# Patient Record
Sex: MALE | Race: WHITE | NOT HISPANIC OR LATINO | URBAN - METROPOLITAN AREA
[De-identification: names, ages, dates, MRNs, and addresses within clinical notes are randomized per-mention and may not be internally consistent; named-entity substitution may affect disease eponyms.]

---

## 2017-08-09 ENCOUNTER — DOCTOR'S OFFICE (OUTPATIENT)
Dept: URBAN - METROPOLITAN AREA CLINIC 166 | Facility: CLINIC | Age: 52
Setting detail: OPHTHALMOLOGY
End: 2017-08-09
Payer: COMMERCIAL

## 2017-08-09 DIAGNOSIS — H10.32: ICD-10-CM

## 2017-08-09 PROCEDURE — 92002 INTRM OPH EXAM NEW PATIENT: CPT | Performed by: OPTOMETRIST

## 2017-08-09 ASSESSMENT — REFRACTION_MANIFEST
OU_VA: 20/
OS_VA3: 20/
OS_VA3: 20/
OD_VA1: 20/
OS_VA2: 20/
OS_VA2: 20/
OU_VA: 20/
OS_VA1: 20/
OD_VA2: 20/
OD_VA2: 20/
OS_VA1: 20/
OS_VA3: 20/
OD_VA3: 20/
OD_VA1: 20/
OD_VA2: 20/
OU_VA: 20/
OD_VA1: 20/
OS_VA1: 20/
OS_VA2: 20/
OD_VA3: 20/
OD_VA3: 20/

## 2017-08-09 ASSESSMENT — REFRACTION_CURRENTRX
OS_OVR_VA: 20/
OD_OVR_VA: 20/
OS_OVR_VA: 20/
OD_OVR_VA: 20/
OS_OVR_VA: 20/
OD_OVR_VA: 20/

## 2017-08-09 ASSESSMENT — VISUAL ACUITY
OS_BCVA: 20/30-3
OD_BCVA: 20/50

## 2018-01-16 ENCOUNTER — DOCTOR'S OFFICE (OUTPATIENT)
Dept: URBAN - METROPOLITAN AREA CLINIC 166 | Facility: CLINIC | Age: 53
Setting detail: OPHTHALMOLOGY
End: 2018-01-16
Payer: COMMERCIAL

## 2018-01-16 DIAGNOSIS — H25.13: ICD-10-CM

## 2018-01-16 DIAGNOSIS — H52.13: ICD-10-CM

## 2018-01-16 DIAGNOSIS — H52.4: ICD-10-CM

## 2018-01-16 PROCEDURE — 92015 DETERMINE REFRACTIVE STATE: CPT | Performed by: OPTOMETRIST

## 2018-01-16 PROCEDURE — 92014 COMPRE OPH EXAM EST PT 1/>: CPT | Performed by: OPTOMETRIST

## 2018-01-16 ASSESSMENT — REFRACTION_CURRENTRX
OS_AXIS: 095
OD_OVR_VA: 20/
OS_OVR_VA: 20/
OS_ADD: +1.50
OD_ADD: +1.50
OD_OVR_VA: 20/
OS_OVR_VA: 20/
OD_OVR_VA: 20/
OS_CYLINDER: +0.50
OD_SPHERE: -6.50
OD_AXIS: 015
OD_CYLINDER: +1.00
OS_VPRISM_DIRECTION: PROGS
OS_OVR_VA: 20/
OS_SPHERE: -7.25
OD_VPRISM_DIRECTION: PROGS

## 2018-01-16 ASSESSMENT — KERATOMETRY
OD_AXISANGLE_DEGREES: 005
OD_K1POWER_DIOPTERS: 44.25
OS_K2POWER_DIOPTERS: 45.00
OS_AXISANGLE_DEGREES: 110
OS_K1POWER_DIOPTERS: 44.25
OD_K2POWER_DIOPTERS: 45.00

## 2018-01-16 ASSESSMENT — REFRACTION_OUTSIDERX
OU_VA: 20/
OS_CYLINDER: +0.50
OD_VA2: 20/
OD_VA3: 20/
OS_SPHERE: -6.00
OS_VA1: 20/30
OS_ADD: +2.25
OD_SPHERE: -6.00
OD_ADD: +2.25
OS_VA2: 20/
OD_CYLINDER: +1.00
OS_VA3: 20/
OD_VA1: 20/30-2
OD_AXIS: 015
OS_AXIS: 135

## 2018-01-16 ASSESSMENT — REFRACTION_AUTOREFRACTION
OD_AXIS: 101
OS_AXIS: 135
OD_SPHERE: -6.00
OS_SPHERE: -5.50
OD_CYLINDER: +1.25
OS_CYLINDER: +0.50

## 2018-01-16 ASSESSMENT — REFRACTION_MANIFEST
OD_VA3: 20/
OS_VA3: 20/
OS_VA1: 20/
OS_VA2: 20/
OU_VA: 20/
OD_VA3: 20/
OD_VA1: 20/
OD_VA2: 20/
OU_VA: 20/
OS_VA3: 20/
OD_VA1: 20/
OS_VA1: 20/
OS_VA2: 20/
OD_VA2: 20/

## 2018-01-16 ASSESSMENT — CONFRONTATIONAL VISUAL FIELD TEST (CVF)
OD_FINDINGS: FULL
OS_FINDINGS: FULL

## 2018-01-16 ASSESSMENT — SPHEQUIV_DERIVED
OD_SPHEQUIV: -5.375
OS_SPHEQUIV: -5.25

## 2018-01-16 ASSESSMENT — AXIALLENGTH_DERIVED
OD_AL: 25.4018
OS_AL: 25.3454

## 2018-01-16 ASSESSMENT — VISUAL ACUITY
OD_BCVA: 20/100-2
OS_BCVA: 20/40-1

## 2018-04-10 ENCOUNTER — DOCTOR'S OFFICE (OUTPATIENT)
Dept: URBAN - METROPOLITAN AREA CLINIC 166 | Facility: CLINIC | Age: 53
Setting detail: OPHTHALMOLOGY
End: 2018-04-10
Payer: COMMERCIAL

## 2018-04-10 DIAGNOSIS — H10.32: ICD-10-CM

## 2018-04-10 PROCEDURE — 92012 INTRM OPH EXAM EST PATIENT: CPT | Performed by: OPTOMETRIST

## 2018-04-10 ASSESSMENT — REFRACTION_CURRENTRX
OS_ADD: +1.50
OD_CYLINDER: +1.00
OS_VPRISM_DIRECTION: PROGS
OS_CYLINDER: +0.50
OS_SPHERE: -7.25
OD_VPRISM_DIRECTION: PROGS
OS_OVR_VA: 20/
OD_ADD: +1.50
OS_OVR_VA: 20/
OD_SPHERE: -6.50
OD_OVR_VA: 20/
OD_AXIS: 015
OS_OVR_VA: 20/
OS_AXIS: 095
OD_OVR_VA: 20/
OD_OVR_VA: 20/

## 2018-04-10 ASSESSMENT — REFRACTION_AUTOREFRACTION
OD_SPHERE: -6.00
OD_CYLINDER: +1.25
OS_CYLINDER: +0.50
OS_SPHERE: -5.50
OD_AXIS: 101
OS_AXIS: 135

## 2018-04-10 ASSESSMENT — REFRACTION_MANIFEST
OD_VA2: 20/
OD_VA1: 20/30-2
OD_SPHERE: -6.00
OS_VA3: 20/
OS_VA2: 20/
OS_SPHERE: -6.00
OS_VA1: 20/30
OS_AXIS: 135
OU_VA: 20/
OS_CYLINDER: +0.50
OU_VA: 20/
OS_VA3: 20/
OD_VA1: 20/
OD_AXIS: 015
OD_VA2: 20/
OD_CYLINDER: +1.00
OS_VA1: 20/
OS_VA2: 20/
OD_VA3: 20/
OD_ADD: +2.25
OD_VA3: 20/
OS_ADD: +2.25

## 2018-04-10 ASSESSMENT — SPHEQUIV_DERIVED
OD_SPHEQUIV: -5.5
OS_SPHEQUIV: -5.25
OD_SPHEQUIV: -5.375
OS_SPHEQUIV: -5.75

## 2018-04-10 ASSESSMENT — VISUAL ACUITY
OD_BCVA: 20/25
OS_BCVA: 20/25

## 2019-10-22 ENCOUNTER — DOCTOR'S OFFICE (OUTPATIENT)
Dept: URBAN - METROPOLITAN AREA CLINIC 166 | Facility: CLINIC | Age: 54
Setting detail: OPHTHALMOLOGY
End: 2019-10-22
Payer: COMMERCIAL

## 2019-10-22 DIAGNOSIS — H53.10: ICD-10-CM

## 2019-10-22 DIAGNOSIS — H52.4: ICD-10-CM

## 2019-10-22 DIAGNOSIS — H52.13: ICD-10-CM

## 2019-10-22 DIAGNOSIS — H25.13: ICD-10-CM

## 2019-10-22 PROCEDURE — 92014 COMPRE OPH EXAM EST PT 1/>: CPT | Performed by: OPTOMETRIST

## 2019-10-22 PROCEDURE — 92015 DETERMINE REFRACTIVE STATE: CPT | Performed by: OPTOMETRIST

## 2019-10-22 PROCEDURE — 92134 CPTRZ OPH DX IMG PST SGM RTA: CPT | Performed by: OPTOMETRIST

## 2019-10-22 ASSESSMENT — REFRACTION_MANIFEST
OD_VA1: 20/25
OS_VA2: 20/25(J1)
OD_ADD: +2.25
OU_VA: 20/
OD_AXIS: 015
OU_VA: 20/
OS_ADD: +2.25
OS_VA3: 20/
OS_CYLINDER: +0.50
OS_VA1: 20/
OD_VA2: 20/25(J1)
OD_VA3: 20/
OS_VA1: 20/50
OD_VA1: 20/
OS_VA2: 20/
OD_SPHERE: -6.00
OD_VA3: 20/
OS_VA3: 20/
OD_CYLINDER: +1.50
OS_AXIS: 135
OD_VA2: 20/
OS_SPHERE: -5.00

## 2019-10-22 ASSESSMENT — REFRACTION_CURRENTRX
OS_OVR_VA: 20/
OS_SPHERE: -6.00
OD_AXIS: 015
OD_OVR_VA: 20/
OD_ADD: +2.25
OD_AXIS: 017
OS_OVR_VA: 20/
OD_CYLINDER: +1.00
OS_AXIS: 137
OD_ADD: +1.50
OD_CYLINDER: +1.00
OS_ADD: +2.25
OD_VPRISM_DIRECTION: PROGS
OS_CYLINDER: +0.50
OS_OVR_VA: 20/
OD_SPHERE: -6.00
OD_VPRISM_DIRECTION: PROGS
OD_SPHERE: -6.50
OS_VPRISM_DIRECTION: PROGS
OS_ADD: +1.50
OD_OVR_VA: 20/
OS_CYLINDER: +0.50
OS_VPRISM_DIRECTION: PROGS
OS_SPHERE: -7.25
OD_OVR_VA: 20/
OS_AXIS: 095

## 2019-10-22 ASSESSMENT — REFRACTION_AUTOREFRACTION
OD_CYLINDER: +1.50
OS_AXIS: 141
OD_SPHERE: -5.75
OS_CYLINDER: +0.50
OS_SPHERE: -5.00
OD_AXIS: 013

## 2019-10-22 ASSESSMENT — VISUAL ACUITY
OS_BCVA: 20/30
OD_BCVA: 20/100-1

## 2019-10-22 ASSESSMENT — KERATOMETRY
OD_K1POWER_DIOPTERS: 44.75
OS_AXISANGLE_DEGREES: 033
OS_K1POWER_DIOPTERS: 44.00
OD_K2POWER_DIOPTERS: 45.00
OS_K2POWER_DIOPTERS: 45.00
OD_AXISANGLE_DEGREES: 026

## 2019-10-22 ASSESSMENT — SPHEQUIV_DERIVED
OS_SPHEQUIV: -4.75
OS_SPHEQUIV: -4.75
OD_SPHEQUIV: -5.25
OD_SPHEQUIV: -5

## 2019-10-22 ASSESSMENT — AXIALLENGTH_DERIVED
OD_AL: 25.2399
OD_AL: 25.1289
OS_AL: 25.1747
OS_AL: 25.1747

## 2019-10-22 ASSESSMENT — CONFRONTATIONAL VISUAL FIELD TEST (CVF)
OD_FINDINGS: FULL
OS_FINDINGS: FULL

## 2019-11-08 ENCOUNTER — DOCTOR'S OFFICE (OUTPATIENT)
Dept: URBAN - METROPOLITAN AREA CLINIC 166 | Facility: CLINIC | Age: 54
Setting detail: OPHTHALMOLOGY
End: 2019-11-08
Payer: COMMERCIAL

## 2019-11-08 DIAGNOSIS — H52.13: ICD-10-CM

## 2019-11-08 DIAGNOSIS — H53.10: ICD-10-CM

## 2019-11-08 DIAGNOSIS — H25.13: ICD-10-CM

## 2019-11-08 PROCEDURE — 92014 COMPRE OPH EXAM EST PT 1/>: CPT | Performed by: OPHTHALMOLOGY

## 2019-11-08 PROCEDURE — 92015 DETERMINE REFRACTIVE STATE: CPT | Performed by: OPHTHALMOLOGY

## 2019-11-08 ASSESSMENT — REFRACTION_AUTOREFRACTION
OS_AXIS: 142
OD_AXIS: 174
OS_SPHERE: -5.00
OS_CYLINDER: +0.25
OD_CYLINDER: +0.75
OD_SPHERE: -5.25

## 2019-11-08 ASSESSMENT — REFRACTION_CURRENTRX
OD_OVR_VA: 20/
OS_AXIS: 130
OD_VPRISM_DIRECTION: PROGS
OD_SPHERE: -6.00
OD_OVR_VA: 20/
OD_CYLINDER: +1.00
OS_OVR_VA: 20/
OS_ADD: +2.25
OS_OVR_VA: 20/
OS_OVR_VA: 20/
OS_VPRISM_DIRECTION: PROGS
OD_ADD: +2.25
OS_CYLINDER: +0.50
OD_AXIS: 015
OS_SPHERE: -6.00
OD_OVR_VA: 20/

## 2019-11-08 ASSESSMENT — VISUAL ACUITY
OD_BCVA: 20/100-1
OS_BCVA: 20/50

## 2019-11-08 ASSESSMENT — KERATOMETRY
METHOD_AUTO_MANUAL: AUTO
OS_AXISANGLE_DEGREES: 099
OS_K2POWER_DIOPTERS: 45.50
OS_K1POWER_DIOPTERS: 44.50
OD_K2POWER_DIOPTERS: 44.50
OD_K1POWER_DIOPTERS: 44.25
OD_AXISANGLE_DEGREES: 008

## 2019-11-08 ASSESSMENT — CONFRONTATIONAL VISUAL FIELD TEST (CVF)
OD_FINDINGS: FULL
OS_FINDINGS: FULL

## 2019-11-08 ASSESSMENT — REFRACTION_MANIFEST
OS_VA2: 20/
OS_AXIS: 135
OD_VA1: 20/
OS_VA3: 20/
OD_VA3: 20/
OD_VA2: 20/
OU_VA: 20/
OD_VA2: 20/25(J1)
OS_VA1: 20/
OS_VA3: 20/
OS_CYLINDER: +0.50
OS_VA1: 20/40-3
OD_CYLINDER: +1.50
OD_SPHERE: -6.50
OS_ADD: +2.25
OD_VA1: 20/25
OD_AXIS: 005
OD_ADD: +2.25
OS_VA2: BINOC
OD_VA3: 20/
OU_VA: 20/
OS_SPHERE: -5.25

## 2019-11-08 ASSESSMENT — SPHEQUIV_DERIVED
OS_SPHEQUIV: -5
OD_SPHEQUIV: -4.875
OS_SPHEQUIV: -4.875
OD_SPHEQUIV: -5.75

## 2019-11-08 ASSESSMENT — AXIALLENGTH_DERIVED
OD_AL: 25.2829
OS_AL: 25.022
OD_AL: 25.6806
OS_AL: 25.0769

## 2020-02-10 ENCOUNTER — DOCTOR'S OFFICE (OUTPATIENT)
Dept: URBAN - METROPOLITAN AREA CLINIC 166 | Facility: CLINIC | Age: 55
Setting detail: OPHTHALMOLOGY
End: 2020-02-10
Payer: COMMERCIAL

## 2020-02-10 DIAGNOSIS — H02.89: ICD-10-CM

## 2020-02-10 PROCEDURE — 92012 INTRM OPH EXAM EST PATIENT: CPT | Performed by: OPHTHALMOLOGY

## 2020-02-10 ASSESSMENT — REFRACTION_MANIFEST
OU_VA: 20/
OD_VA3: 20/
OS_AXIS: 130
OS_AXIS: 135
OU_VA: 20/
OD_VA1: 20/25+1
OD_SPHERE: -6.50
OD_VA3: 20/
OD_VA2: 20/
OD_ADD: +2.25
OD_VA2: 20/25(J1)
OD_SPHERE: -6.00
OD_AXIS: 015
OD_CYLINDER: +1.00
OS_VA1: 20/40-3
OS_VA2: BINOC
OS_SPHERE: -5.25
OS_ADD: +2.25
OD_CYLINDER: +1.50
OS_VA1: 20/60+1
OS_VA3: 20/
OS_SPHERE: -5.75
OD_AXIS: 005
OS_CYLINDER: +0.50
OS_CYLINDER: +0.50
OS_VA2: 20/
OS_VA3: 20/
OD_VA1: 20/25

## 2020-02-10 ASSESSMENT — SPHEQUIV_DERIVED
OS_SPHEQUIV: -5
OD_SPHEQUIV: -5.5
OS_SPHEQUIV: -5.5
OD_SPHEQUIV: -5.75

## 2020-02-10 ASSESSMENT — CONFRONTATIONAL VISUAL FIELD TEST (CVF)
OS_FINDINGS: FULL
OD_FINDINGS: FULL

## 2020-02-10 ASSESSMENT — LID EXAM ASSESSMENTS: OS_MEIBOMITIS: LLL LUL 1+

## 2020-02-13 ASSESSMENT — REFRACTION_AUTOREFRACTION
OS_CYLINDER: +0.25
OD_SPHERE: -5.25
OD_AXIS: 174
OS_AXIS: 142
OS_SPHERE: -5.00
OD_CYLINDER: +0.75

## 2020-02-13 ASSESSMENT — REFRACTION_CURRENTRX
OS_OVR_VA: 20/
OD_VPRISM_DIRECTION: PROGS
OS_AXIS: 130
OS_SPHERE: -6.00
OS_OVR_VA: 20/
OS_VPRISM_DIRECTION: PROGS
OD_SPHERE: -6.00
OS_CYLINDER: +0.50
OD_OVR_VA: 20/
OD_OVR_VA: 20/
OD_AXIS: 015
OS_ADD: +2.25
OD_CYLINDER: +1.00
OD_OVR_VA: 20/
OS_OVR_VA: 20/
OD_ADD: +2.25

## 2020-02-13 ASSESSMENT — VISUAL ACUITY
OD_BCVA: 20/60
OS_BCVA: 20/30+1

## 2020-02-13 ASSESSMENT — SPHEQUIV_DERIVED
OD_SPHEQUIV: -4.875
OS_SPHEQUIV: -4.875

## 2020-02-24 ENCOUNTER — DOCTOR'S OFFICE (OUTPATIENT)
Dept: URBAN - METROPOLITAN AREA CLINIC 166 | Facility: CLINIC | Age: 55
Setting detail: OPHTHALMOLOGY
End: 2020-02-24
Payer: COMMERCIAL

## 2020-02-24 DIAGNOSIS — H02.89: ICD-10-CM

## 2020-02-24 PROCEDURE — 92012 INTRM OPH EXAM EST PATIENT: CPT | Performed by: OPHTHALMOLOGY

## 2020-02-24 ASSESSMENT — VISUAL ACUITY
OS_BCVA: 20/40+1
OD_BCVA: 20/30+2

## 2020-02-24 ASSESSMENT — REFRACTION_MANIFEST
OD_VA1: 20/25+1
OD_CYLINDER: +1.00
OD_AXIS: 005
OS_CYLINDER: +0.50
OD_ADD: +2.25
OD_SPHERE: -6.00
OD_AXIS: 015
OU_VA: 20/
OS_ADD: +2.25
OD_SPHERE: -6.50
OS_VA2: BINOC
OS_VA3: 20/
OS_SPHERE: -5.25
OU_VA: 20/
OD_VA2: 20/
OS_AXIS: 135
OS_SPHERE: -5.75
OS_VA3: 20/
OS_VA1: 20/60+1
OS_VA1: 20/40-3
OD_VA2: 20/25(J1)
OD_VA3: 20/
OD_VA1: 20/25
OD_CYLINDER: +1.50
OS_CYLINDER: +0.50
OS_VA2: 20/
OS_AXIS: 130
OD_VA3: 20/

## 2020-02-24 ASSESSMENT — REFRACTION_CURRENTRX
OS_SPHERE: -6.00
OS_CYLINDER: +0.50
OS_OVR_VA: 20/
OD_VPRISM_DIRECTION: PROGS
OD_OVR_VA: 20/
OS_OVR_VA: 20/
OD_AXIS: 015
OS_ADD: +2.25
OD_CYLINDER: +1.00
OD_OVR_VA: 20/
OS_VPRISM_DIRECTION: PROGS
OD_OVR_VA: 20/
OS_AXIS: 130
OD_ADD: +2.25
OS_OVR_VA: 20/
OD_SPHERE: -6.00

## 2020-02-24 ASSESSMENT — SPHEQUIV_DERIVED
OD_SPHEQUIV: -5.5
OS_SPHEQUIV: -5.5
OD_SPHEQUIV: -5.75
OD_SPHEQUIV: -4.875
OS_SPHEQUIV: -4.875
OS_SPHEQUIV: -5

## 2020-02-24 ASSESSMENT — REFRACTION_AUTOREFRACTION
OD_SPHERE: -5.25
OD_AXIS: 174
OS_AXIS: 142
OD_CYLINDER: +0.75
OS_SPHERE: -5.00
OS_CYLINDER: +0.25

## 2020-02-24 ASSESSMENT — LID EXAM ASSESSMENTS: OS_MEIBOMITIS: LLL LUL 1+

## 2020-03-19 ENCOUNTER — DOCTOR'S OFFICE (OUTPATIENT)
Dept: URBAN - METROPOLITAN AREA CLINIC 166 | Facility: CLINIC | Age: 55
Setting detail: OPHTHALMOLOGY
End: 2020-03-19
Payer: COMMERCIAL

## 2020-03-19 DIAGNOSIS — H02.89: ICD-10-CM

## 2020-03-19 PROCEDURE — 92012 INTRM OPH EXAM EST PATIENT: CPT | Performed by: OPHTHALMOLOGY

## 2020-03-19 ASSESSMENT — REFRACTION_MANIFEST
OS_VA2: 20/BINOC
OS_AXIS: 135
OS_VA3: 20/
OS_VA2: BINOC
OD_VA2: 20/25(J1)
OS_VA1: 20/40-3
OD_VA1: 20/30-1
OS_VA3: 20/
OS_VA1: 20/25
OD_AXIS: 015
OD_CYLINDER: +1.00
OD_SPHERE: -6.00
OS_SPHERE: -6.00
OD_ADD: +2.50
OD_VA2: 20/20(J1+)
OD_SPHERE: -6.50
OD_AXIS: 005
OS_CYLINDER: +0.50
OD_VA1: 20/25
OD_ADD: +2.25
OS_SPHERE: -5.25
OD_CYLINDER: +1.50
OS_CYLINDER: +0.50
OU_VA: 20/
OD_VA3: 20/
OS_ADD: +2.25
OS_ADD: +2.50
OS_AXIS: 130
OD_VA3: 20/
OU_VA: 20/

## 2020-03-19 ASSESSMENT — REFRACTION_AUTOREFRACTION
OD_AXIS: 174
OS_CYLINDER: +0.25
OS_SPHERE: -5.00
OD_CYLINDER: +0.75
OS_AXIS: 142
OD_SPHERE: -5.25

## 2020-03-19 ASSESSMENT — REFRACTION_CURRENTRX
OD_SPHERE: -6.00
OS_CYLINDER: +0.50
OD_OVR_VA: 20/
OD_ADD: +2.25
OD_AXIS: 015
OS_VPRISM_DIRECTION: PROGS
OS_SPHERE: -6.00
OS_ADD: +2.25
OS_OVR_VA: 20/
OS_AXIS: 130
OD_OVR_VA: 20/
OD_OVR_VA: 20/
OD_CYLINDER: +1.00
OD_VPRISM_DIRECTION: PROGS
OS_OVR_VA: 20/
OS_OVR_VA: 20/

## 2020-03-19 ASSESSMENT — SPHEQUIV_DERIVED
OD_SPHEQUIV: -5.75
OD_SPHEQUIV: -4.875
OS_SPHEQUIV: -4.875
OD_SPHEQUIV: -5.5
OS_SPHEQUIV: -5.75
OS_SPHEQUIV: -5

## 2020-03-19 ASSESSMENT — LID EXAM ASSESSMENTS: OS_MEIBOMITIS: LLL LUL 1+

## 2020-03-19 ASSESSMENT — VISUAL ACUITY
OS_BCVA: 20/30
OD_BCVA: 20/30

## 2021-02-25 ENCOUNTER — NURSE TRIAGE (OUTPATIENT)
Dept: OTHER | Facility: CLINIC | Age: 56
End: 2021-02-25

## 2021-03-18 ENCOUNTER — DOCTOR'S OFFICE (OUTPATIENT)
Dept: URBAN - METROPOLITAN AREA CLINIC 166 | Facility: CLINIC | Age: 56
Setting detail: OPHTHALMOLOGY
End: 2021-03-18

## 2021-03-18 ENCOUNTER — DOCTOR'S OFFICE (OUTPATIENT)
Dept: URBAN - METROPOLITAN AREA CLINIC 166 | Facility: CLINIC | Age: 56
Setting detail: OPHTHALMOLOGY
End: 2021-03-18
Payer: COMMERCIAL

## 2021-03-18 VITALS — HEIGHT: 60 IN

## 2021-03-18 DIAGNOSIS — H01.003: ICD-10-CM

## 2021-03-18 DIAGNOSIS — H04.123: ICD-10-CM

## 2021-03-18 DIAGNOSIS — H52.13: ICD-10-CM

## 2021-03-18 DIAGNOSIS — H53.10: ICD-10-CM

## 2021-03-18 DIAGNOSIS — H25.13: ICD-10-CM

## 2021-03-18 PROCEDURE — THERAPEARL THERAPEARL: Performed by: OPHTHALMOLOGY

## 2021-03-18 PROCEDURE — 92012 INTRM OPH EXAM EST PATIENT: CPT | Performed by: OPHTHALMOLOGY

## 2021-03-18 ASSESSMENT — KERATOMETRY
OS_K2POWER_DIOPTERS: 45.50
OS_AXISANGLE_DEGREES: 099
METHOD_AUTO_MANUAL: AUTO
OD_AXISANGLE_DEGREES: 008
OD_K1POWER_DIOPTERS: 44.25
OD_K2POWER_DIOPTERS: 44.50
OS_K1POWER_DIOPTERS: 44.50

## 2021-03-18 ASSESSMENT — TONOMETRY
OD_IOP_MMHG: 18
OS_IOP_MMHG: 18

## 2021-03-18 ASSESSMENT — REFRACTION_CURRENTRX
OS_AXIS: 130
OD_ADD: +2.25
OS_ADD: +2.25
OD_VPRISM_DIRECTION: PROGS
OD_SPHERE: -6.00
OS_CYLINDER: +0.50
OD_OVR_VA: 20/
OD_CYLINDER: +1.00
OD_AXIS: 015
OS_OVR_VA: 20/
OS_SPHERE: -6.00
OS_VPRISM_DIRECTION: PROGS

## 2021-03-18 ASSESSMENT — AXIALLENGTH_DERIVED
OS_AL: 25.022
OD_AL: 25.5657
OS_AL: 25.0769
OD_AL: 25.6806
OD_AL: 25.2829
OS_AL: 25.4115

## 2021-03-18 ASSESSMENT — REFRACTION_AUTOREFRACTION
OD_CYLINDER: +0.75
OS_CYLINDER: +0.25
OD_SPHERE: -5.25
OS_SPHERE: -5.00
OD_AXIS: 174
OS_AXIS: 142

## 2021-03-18 ASSESSMENT — REFRACTION_MANIFEST
OD_ADD: +2.25
OS_CYLINDER: +0.50
OD_ADD: +2.50
OD_AXIS: 005
OD_CYLINDER: +1.00
OD_VA1: 20/25
OS_AXIS: 130
OS_SPHERE: -6.00
OD_SPHERE: -6.50
OS_VA2: 20/BINOC
OD_VA2: 20/20(J1+)
OS_ADD: +2.50
OD_VA1: 20/30-1
OD_VA2: 20/25(J1)
OS_VA2: BINOC
OS_VA1: 20/40-3
OS_VA1: 20/25
OS_CYLINDER: +0.50
OD_AXIS: 015
OS_SPHERE: -5.25
OS_ADD: +2.25
OS_AXIS: 135
OD_CYLINDER: +1.50
OD_SPHERE: -6.00

## 2021-03-18 ASSESSMENT — LID EXAM ASSESSMENTS
OS_MEIBOMITIS: LLL LUL 1+
OD_MEIBOMITIS: RLL 1+

## 2021-03-18 ASSESSMENT — SPHEQUIV_DERIVED
OD_SPHEQUIV: -5.5
OD_SPHEQUIV: -5.75
OS_SPHEQUIV: -5
OD_SPHEQUIV: -4.875
OS_SPHEQUIV: -4.875
OS_SPHEQUIV: -5.75

## 2021-03-18 ASSESSMENT — VISUAL ACUITY
OD_BCVA: 20/30
OS_BCVA: 20/30

## 2021-04-01 ENCOUNTER — DOCTOR'S OFFICE (OUTPATIENT)
Dept: URBAN - METROPOLITAN AREA CLINIC 166 | Facility: CLINIC | Age: 56
Setting detail: OPHTHALMOLOGY
End: 2021-04-01
Payer: COMMERCIAL

## 2021-04-01 DIAGNOSIS — H25.12: ICD-10-CM

## 2021-04-01 DIAGNOSIS — H01.002: ICD-10-CM

## 2021-04-01 DIAGNOSIS — H01.004: ICD-10-CM

## 2021-04-01 DIAGNOSIS — H01.005: ICD-10-CM

## 2021-04-01 PROCEDURE — 92012 INTRM OPH EXAM EST PATIENT: CPT | Performed by: OPHTHALMOLOGY

## 2021-04-01 ASSESSMENT — REFRACTION_MANIFEST
OD_CYLINDER: +1.00
OD_ADD: +2.25
OD_ADD: +2.50
OD_SPHERE: -6.50
OS_VA1: 20/40-3
OS_SPHERE: -5.25
OD_VA1: 20/30-1
OD_SPHERE: -6.00
OS_ADD: +2.50
OS_VA2: 20/BINOC
OD_AXIS: 005
OD_VA1: 20/25
OS_VA1: 20/25
OD_CYLINDER: +1.50
OD_VA2: 20/25(J1)
OD_AXIS: 015
OS_CYLINDER: +0.50
OS_VA2: BINOC
OD_VA2: 20/20(J1+)
OS_CYLINDER: +0.50
OS_ADD: +2.25
OS_AXIS: 135
OS_SPHERE: -6.00
OS_AXIS: 130

## 2021-04-01 ASSESSMENT — REFRACTION_CURRENTRX
OD_CYLINDER: +1.00
OD_OVR_VA: 20/
OS_SPHERE: -6.00
OS_OVR_VA: 20/
OD_AXIS: 015
OD_SPHERE: -6.00
OS_ADD: +2.25
OD_VPRISM_DIRECTION: PROGS
OD_ADD: +2.25
OS_CYLINDER: +0.50
OS_AXIS: 130
OS_VPRISM_DIRECTION: PROGS

## 2021-04-01 ASSESSMENT — KERATOMETRY
OS_K1POWER_DIOPTERS: 44.00
OD_K2POWER_DIOPTERS: 44.50
OD_K1POWER_DIOPTERS: 44.25
OS_AXISANGLE_DEGREES: 107
METHOD_AUTO_MANUAL: AUTO
OS_K2POWER_DIOPTERS: 45.00
OD_AXISANGLE_DEGREES: 179

## 2021-04-01 ASSESSMENT — VISUAL ACUITY
OD_BCVA: 20/30-2
OS_BCVA: 20/40+2

## 2021-04-01 ASSESSMENT — AXIALLENGTH_DERIVED
OS_AL: 25.2861
OD_AL: 25.6806
OS_AL: 25.7418
OS_AL: 25.6263
OD_AL: 25.2271
OD_AL: 25.5657

## 2021-04-01 ASSESSMENT — LID EXAM ASSESSMENTS
OS_MEIBOMITIS: LLL LUL 1+
OD_MEIBOMITIS: RLL 1+

## 2021-04-01 ASSESSMENT — REFRACTION_AUTOREFRACTION
OS_AXIS: 101
OS_SPHERE: -6.50
OD_SPHERE: -5.25
OD_AXIS: 002
OD_CYLINDER: +1.00
OS_CYLINDER: +1.00

## 2021-04-01 ASSESSMENT — SPHEQUIV_DERIVED
OD_SPHEQUIV: -5.5
OS_SPHEQUIV: -5.75
OD_SPHEQUIV: -4.75
OS_SPHEQUIV: -5
OS_SPHEQUIV: -6
OD_SPHEQUIV: -5.75

## 2021-04-29 ENCOUNTER — DOCTOR'S OFFICE (OUTPATIENT)
Dept: URBAN - METROPOLITAN AREA CLINIC 166 | Facility: CLINIC | Age: 56
Setting detail: OPHTHALMOLOGY
End: 2021-04-29
Payer: COMMERCIAL

## 2021-04-29 ENCOUNTER — RX ONLY (RX ONLY)
Age: 56
End: 2021-04-29

## 2021-04-29 PROCEDURE — 92014 COMPRE OPH EXAM EST PT 1/>: CPT | Performed by: OPHTHALMOLOGY

## 2021-04-29 PROCEDURE — 92015 DETERMINE REFRACTIVE STATE: CPT | Performed by: OPHTHALMOLOGY

## 2021-04-29 ASSESSMENT — REFRACTION_CURRENTRX
OS_CYLINDER: +0.50
OD_ADD: +2.25
OD_OVR_VA: 20/
OS_VPRISM_DIRECTION: PROGS
OS_ADD: +2.25
OS_SPHERE: -6.00
OS_OVR_VA: 20/
OS_AXIS: 130
OD_SPHERE: -6.00
OD_AXIS: 015
OD_VPRISM_DIRECTION: PROGS
OD_CYLINDER: +1.00

## 2021-04-29 ASSESSMENT — REFRACTION_MANIFEST
OD_AXIS: 015
OS_ADD: +2.25
OD_AXIS: 005
OS_VA2: 20/BINOC
OD_VA1: 20/30-1
OS_AXIS: 130
OS_ADD: +2.50
OD_CYLINDER: +1.50
OS_VA1: 20/25
OD_ADD: +2.50
OD_VA1: 20/25+1
OS_CYLINDER: +0.50
OS_VA2: BINOC
OD_SPHERE: -6.00
OS_CYLINDER: +0.50
OS_AXIS: 130
OD_VA2: 20/25(J1)
OD_VA2: 20/20(J1+)
OD_ADD: +2.50
OS_VA1: 20/25
OS_SPHERE: -6.00
OD_CYLINDER: +1.00
OS_SPHERE: -6.00
OD_SPHERE: -6.00

## 2021-04-29 ASSESSMENT — TONOMETRY
OD_IOP_MMHG: 18
OS_IOP_MMHG: 18

## 2021-04-29 ASSESSMENT — VISUAL ACUITY
OS_BCVA: 20/40+1
OD_BCVA: 20/30-2

## 2021-04-29 ASSESSMENT — REFRACTION_AUTOREFRACTION
OS_SPHERE: -5.25
OD_AXIS: 001
OD_CYLINDER: +1.25
OS_CYLINDER: +0.50
OD_SPHERE: -5.75
OS_AXIS: 099

## 2021-04-29 ASSESSMENT — CONFRONTATIONAL VISUAL FIELD TEST (CVF)
OS_FINDINGS: FULL
OD_FINDINGS: FULL

## 2021-04-29 ASSESSMENT — SPHEQUIV_DERIVED
OS_SPHEQUIV: -5.75
OD_SPHEQUIV: -5.125
OD_SPHEQUIV: -5.25
OS_SPHEQUIV: -5
OS_SPHEQUIV: -5.75
OD_SPHEQUIV: -5.5

## 2021-04-29 ASSESSMENT — KERATOMETRY
OS_K2POWER_DIOPTERS: 44.25
OS_AXISANGLE_DEGREES: 110
METHOD_AUTO_MANUAL: AUTO
OD_AXISANGLE_DEGREES: 011
OD_K2POWER_DIOPTERS: 45.00
OS_K1POWER_DIOPTERS: 43.25
OD_K1POWER_DIOPTERS: 44.00

## 2021-04-29 ASSESSMENT — LID EXAM ASSESSMENTS
OS_MEIBOMITIS: LLL LUL 1+
OD_MEIBOMITIS: RLL 1+

## 2021-04-29 ASSESSMENT — AXIALLENGTH_DERIVED
OD_AL: 25.3985
OD_AL: 25.5119
OS_AL: 25.6066
OS_AL: 25.9555
OD_AL: 25.3422
OS_AL: 25.9555

## 2022-06-10 ENCOUNTER — DOCTOR'S OFFICE (OUTPATIENT)
Dept: URBAN - METROPOLITAN AREA CLINIC 157 | Facility: CLINIC | Age: 57
Setting detail: OPHTHALMOLOGY
End: 2022-06-10
Payer: COMMERCIAL

## 2022-06-10 PROCEDURE — 99213 OFFICE O/P EST LOW 20 MIN: CPT | Performed by: OPTOMETRIST

## 2022-06-10 ASSESSMENT — REFRACTION_MANIFEST
OD_ADD: +2.50
OD_CYLINDER: +1.50
OS_ADD: +2.25
OS_SPHERE: -6.00
OS_CYLINDER: +0.50
OD_SPHERE: -6.00
OD_VA2: 20/20(J1+)
OS_VA1: 20/25
OD_ADD: +2.50
OS_CYLINDER: +0.50
OD_VA2: 20/25(J1)
OD_AXIS: 005
OS_SPHERE: -6.00
OD_CYLINDER: +1.00
OD_SPHERE: -6.00
OS_AXIS: 130
OS_AXIS: 130
OD_VA1: 20/30-1
OS_ADD: +2.50
OS_VA1: 20/25
OD_AXIS: 015
OD_VA1: 20/25+1
OS_VA2: BINOC
OS_VA2: 20/BINOC

## 2022-06-10 ASSESSMENT — VISUAL ACUITY
OD_BCVA: 20/80
OS_BCVA: 20/25

## 2022-06-10 ASSESSMENT — REFRACTION_CURRENTRX
OD_CYLINDER: +1.00
OS_OVR_VA: 20/
OS_CYLINDER: +0.50
OD_ADD: +2.25
OD_AXIS: 015
OS_VPRISM_DIRECTION: PROGS
OS_AXIS: 130
OS_SPHERE: -6.00
OD_VPRISM_DIRECTION: PROGS
OD_SPHERE: -6.00
OS_ADD: +2.25
OD_OVR_VA: 20/

## 2022-06-10 ASSESSMENT — SPHEQUIV_DERIVED
OD_SPHEQUIV: -5.25
OS_SPHEQUIV: -5.75
OS_SPHEQUIV: -5.75
OD_SPHEQUIV: -5.5
OS_SPHEQUIV: 23.375
OD_SPHEQUIV: -4.5

## 2022-06-10 ASSESSMENT — LID EXAM ASSESSMENTS
OS_MEIBOMITIS: LLL LUL 1+
OD_MEIBOMITIS: RLL 1+
OS_EDEMA: LUL 1+

## 2022-06-10 ASSESSMENT — REFRACTION_AUTOREFRACTION
OD_CYLINDER: +1.50
OS_SPHERE: +23.25
OS_AXIS: 170
OS_CYLINDER: +0.25
OD_SPHERE: -5.25
OD_AXIS: 005

## 2022-06-10 ASSESSMENT — AXIALLENGTH_DERIVED
OD_AL: 25.5657
OS_AL: 25.5184
OS_AL: 16.79
OD_AL: 25.1162
OS_AL: 25.5184
OD_AL: 25.4519

## 2022-06-10 ASSESSMENT — CONFRONTATIONAL VISUAL FIELD TEST (CVF)
OD_FINDINGS: FULL
OS_FINDINGS: FULL

## 2022-06-10 ASSESSMENT — KERATOMETRY
OD_K1POWER_DIOPTERS: 44.00
OS_K2POWER_DIOPTERS: 45.50
OS_K1POWER_DIOPTERS: 44.00
METHOD_AUTO_MANUAL: AUTO
OD_K2POWER_DIOPTERS: 44.75
OD_AXISANGLE_DEGREES: 175
OS_AXISANGLE_DEGREES: 125

## 2022-06-10 ASSESSMENT — TONOMETRY
OS_IOP_MMHG: 16
OD_IOP_MMHG: 16

## 2022-06-17 ENCOUNTER — RX ONLY (RX ONLY)
Age: 57
End: 2022-06-17

## 2022-06-17 ENCOUNTER — DOCTOR'S OFFICE (OUTPATIENT)
Dept: URBAN - METROPOLITAN AREA CLINIC 157 | Facility: CLINIC | Age: 57
Setting detail: OPHTHALMOLOGY
End: 2022-06-17
Payer: COMMERCIAL

## 2022-06-17 PROCEDURE — 92014 COMPRE OPH EXAM EST PT 1/>: CPT | Performed by: OPTOMETRIST

## 2022-06-17 ASSESSMENT — SPHEQUIV_DERIVED
OS_SPHEQUIV: -5.75
OD_SPHEQUIV: -5.25
OS_SPHEQUIV: -5.5
OS_SPHEQUIV: -5.75
OD_SPHEQUIV: -5.5
OD_SPHEQUIV: -4.5
OS_SPHEQUIV: 23.375

## 2022-06-17 ASSESSMENT — TONOMETRY
OS_IOP_MMHG: 19
OD_IOP_MMHG: 15

## 2022-06-17 ASSESSMENT — REFRACTION_MANIFEST
OD_VA1: 20/30-1
OD_SPHERE: -6.00
OD_VA2: 20/25(J1)
OS_VA1: 20/50
OS_SPHERE: -6.00
OS_CYLINDER: +1.00
OS_VA1: 20/25
OD_VA1: 20/25+1
OD_VA2: 20/20(J1+)
OS_CYLINDER: +0.50
OD_ADD: +2.50
OD_SPHERE: -6.00
OS_ADD: +2.25
OS_VA2: BINOC
OS_SPHERE: -6.00
OS_ADD: +2.50
OS_AXIS: 120
OD_AXIS: 015
OD_CYLINDER: +1.50
OS_CYLINDER: +0.50
OS_AXIS: 130
OS_VA2: 20/BINOC
OD_CYLINDER: +1.00
OS_VA1: 20/25
OD_ADD: +2.50
OD_AXIS: 005
OS_SPHERE: -6.00
OS_AXIS: 130

## 2022-06-17 ASSESSMENT — LID POSITION - ENTROPION: OS_ENTROPION: LUL 1+

## 2022-06-17 ASSESSMENT — KERATOMETRY
OD_K1POWER_DIOPTERS: 44.00
OS_K2POWER_DIOPTERS: 45.50
METHOD_AUTO_MANUAL: AUTO
OS_K1POWER_DIOPTERS: 44.00
OD_AXISANGLE_DEGREES: 175
OD_K2POWER_DIOPTERS: 44.75
OS_AXISANGLE_DEGREES: 125

## 2022-06-17 ASSESSMENT — AXIALLENGTH_DERIVED
OS_AL: 25.5184
OS_AL: 25.405
OS_AL: 25.5184
OD_AL: 25.5657
OS_AL: 16.79
OD_AL: 25.1162
OD_AL: 25.4519

## 2022-06-17 ASSESSMENT — REFRACTION_AUTOREFRACTION
OS_CYLINDER: +0.25
OD_AXIS: 005
OD_SPHERE: -5.25
OS_SPHERE: +23.25
OD_CYLINDER: +1.50
OS_AXIS: 170

## 2022-06-17 ASSESSMENT — REFRACTION_CURRENTRX
OS_OVR_VA: 20/
OD_AXIS: 015
OD_OVR_VA: 20/
OD_ADD: +2.25
OD_CYLINDER: +1.00
OS_ADD: +2.25
OS_SPHERE: -6.00
OD_SPHERE: -6.00
OS_VPRISM_DIRECTION: PROGS
OS_AXIS: 130
OS_CYLINDER: +0.50
OD_VPRISM_DIRECTION: PROGS

## 2022-06-17 ASSESSMENT — LID EXAM ASSESSMENTS
OS_MEIBOMITIS: LLL LUL 1+
OD_MEIBOMITIS: RLL 1+

## 2022-06-17 ASSESSMENT — VISUAL ACUITY
OS_BCVA: 20/20
OD_BCVA: 20/80

## 2022-06-17 ASSESSMENT — CONFRONTATIONAL VISUAL FIELD TEST (CVF)
OD_FINDINGS: FULL
OS_FINDINGS: FULL

## 2022-07-01 ENCOUNTER — DOCTOR'S OFFICE (OUTPATIENT)
Dept: URBAN - METROPOLITAN AREA CLINIC 157 | Facility: CLINIC | Age: 57
Setting detail: OPHTHALMOLOGY
End: 2022-07-01
Payer: COMMERCIAL

## 2022-07-01 PROCEDURE — 92012 INTRM OPH EXAM EST PATIENT: CPT | Performed by: OPHTHALMOLOGY

## 2022-07-01 ASSESSMENT — REFRACTION_CURRENTRX
OD_SPHERE: -6.00
OD_VPRISM_DIRECTION: PROGS
OS_SPHERE: -6.00
OD_ADD: +2.25
OS_AXIS: 130
OD_CYLINDER: +1.00
OS_OVR_VA: 20/
OS_CYLINDER: +0.50
OS_VPRISM_DIRECTION: PROGS
OD_OVR_VA: 20/
OS_ADD: +2.25
OD_AXIS: 015

## 2022-07-01 ASSESSMENT — KERATOMETRY
OD_K2POWER_DIOPTERS: 44.75
OS_AXISANGLE_DEGREES: 170
OS_K1POWER_DIOPTERS: 43.50
OD_K1POWER_DIOPTERS: 43.75
METHOD_AUTO_MANUAL: AUTO
OS_K2POWER_DIOPTERS: 44.00
OD_AXISANGLE_DEGREES: 5

## 2022-07-01 ASSESSMENT — VISUAL ACUITY
OD_BCVA: 20/80
OS_BCVA: 20/20

## 2022-07-01 ASSESSMENT — REFRACTION_MANIFEST
OS_AXIS: 120
OD_VA1: 20/25
OD_AXIS: 015
OD_CYLINDER: +1.50
OS_SPHERE: -6.00
OD_ADD: +2.50
OS_SPHERE: -6.00
OS_SPHERE: -6.00
OS_AXIS: 130
OD_VA2: 20/25(J1)
OD_VA1: 20/30-1
OD_CYLINDER: +1.00
OS_VA1: 20/50
OD_SPHERE: -5.25
OS_ADD: +2.25
OD_VA1: 20/25+1
OD_AXIS: 010
OD_ADD: +2.50
OS_ADD: +2.50
OS_CYLINDER: +0.50
OS_VA1: 20/25
OD_CYLINDER: +1.00
OS_AXIS: 130
OS_CYLINDER: +1.00
OS_CYLINDER: +0.50
OS_VA1: 20/80
OS_VA1: 20/25
OS_VA2: 20/BINOC
OD_SPHERE: -6.00
OD_VA2: 20/20(J1+)
OD_AXIS: 005
OD_SPHERE: -6.00
OS_CYLINDER: +1.00
OS_AXIS: 150
OS_SPHERE: -6.50
OS_VA2: BINOC

## 2022-07-01 ASSESSMENT — AXIALLENGTH_DERIVED
OS_AL: 26.074
OD_AL: 25.6198
OD_AL: 25.2797
OS_AL: 25.9555
OS_AL: 16.9736
OS_AL: 25.9555
OS_AL: 25.8382
OD_AL: 25.5054

## 2022-07-01 ASSESSMENT — SPHEQUIV_DERIVED
OS_SPHEQUIV: -5.75
OD_SPHEQUIV: -4.75
OD_SPHEQUIV: -5.25
OS_SPHEQUIV: 23.375
OS_SPHEQUIV: -5.75
OS_SPHEQUIV: -6
OS_SPHEQUIV: -5.5
OD_SPHEQUIV: -5.5

## 2022-07-01 ASSESSMENT — REFRACTION_AUTOREFRACTION
OS_SPHERE: +23.25
OD_SPHERE: -5.25
OS_AXIS: 170
OD_CYLINDER: ++2.25
OD_AXIS: 5
OS_CYLINDER: +0.25

## 2022-07-01 ASSESSMENT — LID EXAM ASSESSMENTS
OD_MEIBOMITIS: RLL 1+
OS_MEIBOMITIS: LLL LUL 1+

## 2022-07-01 ASSESSMENT — LID POSITION - ENTROPION: OS_ENTROPION: LUL 1+

## 2022-07-01 ASSESSMENT — TONOMETRY
OD_IOP_MMHG: 16
OS_IOP_MMHG: 16

## 2022-07-12 ENCOUNTER — DOCTOR'S OFFICE (OUTPATIENT)
Dept: URBAN - METROPOLITAN AREA CLINIC 157 | Facility: CLINIC | Age: 57
Setting detail: OPHTHALMOLOGY
End: 2022-07-12
Payer: COMMERCIAL

## 2022-07-12 PROCEDURE — 92136 OPHTHALMIC BIOMETRY: CPT | Performed by: OPHTHALMOLOGY

## 2022-07-26 ENCOUNTER — AMBUL SURGICAL CARE (OUTPATIENT)
Dept: URBAN - METROPOLITAN AREA CLINIC 158 | Facility: CLINIC | Age: 57
Setting detail: OPHTHALMOLOGY
End: 2022-07-26
Payer: COMMERCIAL

## 2022-07-26 PROCEDURE — 66984 XCAPSL CTRC RMVL W/O ECP: CPT | Performed by: OPHTHALMOLOGY

## 2022-07-27 ENCOUNTER — DOCTOR'S OFFICE (OUTPATIENT)
Dept: URBAN - METROPOLITAN AREA CLINIC 157 | Facility: CLINIC | Age: 57
Setting detail: OPHTHALMOLOGY
End: 2022-07-27
Payer: COMMERCIAL

## 2022-07-27 PROCEDURE — 99024 POSTOP FOLLOW-UP VISIT: CPT | Performed by: OPHTHALMOLOGY

## 2022-07-27 ASSESSMENT — REFRACTION_MANIFEST
OS_VA1: 20/80
OD_CYLINDER: +1.50
OD_AXIS: 010
OS_AXIS: 130
OD_AXIS: 015
OD_CYLINDER: +1.00
OS_CYLINDER: +0.50
OS_SPHERE: -6.00
OS_VA2: 20/BINOC
OS_CYLINDER: +1.00
OS_VA1: 20/25
OS_SPHERE: -6.50
OS_ADD: +2.50
OS_ADD: +2.25
OD_VA1: 20/25
OS_AXIS: 150
OS_AXIS: 130
OS_VA2: BINOC
OD_SPHERE: -6.00
OD_AXIS: 005
OS_SPHERE: -6.00
OS_CYLINDER: +0.50
OS_AXIS: 120
OD_VA1: 20/25+1
OS_CYLINDER: +1.00
OS_SPHERE: -6.00
OD_SPHERE: -6.00
OS_VA1: 20/50
OD_VA2: 20/25(J1)
OD_CYLINDER: +1.00
OD_VA1: 20/30-1
OS_VA1: 20/25
OD_ADD: +2.50
OD_ADD: +2.50
OD_VA2: 20/20(J1+)
OD_SPHERE: -5.25

## 2022-07-27 ASSESSMENT — REFRACTION_CURRENTRX
OS_SPHERE: -6.00
OD_ADD: +2.25
OD_OVR_VA: 20/
OD_VPRISM_DIRECTION: PROGS
OD_SPHERE: -6.00
OS_AXIS: 130
OS_ADD: +2.25
OD_CYLINDER: +1.00
OD_AXIS: 015
OS_CYLINDER: +0.50
OS_VPRISM_DIRECTION: PROGS
OS_OVR_VA: 20/

## 2022-07-27 ASSESSMENT — AXIALLENGTH_DERIVED
OD_AL: 25.6198
OD_AL: 25.5054
OS_AL: 25.9555
OS_AL: 25.8382
OS_AL: 25.9555
OS_AL: 23.5004
OD_AL: 25.2797
OD_AL: 25.2797
OS_AL: 26.074

## 2022-07-27 ASSESSMENT — SPHEQUIV_DERIVED
OS_SPHEQUIV: -5.75
OD_SPHEQUIV: -5.25
OS_SPHEQUIV: -5.75
OS_SPHEQUIV: -6
OD_SPHEQUIV: -4.75
OD_SPHEQUIV: -4.75
OS_SPHEQUIV: 0
OD_SPHEQUIV: -5.5
OS_SPHEQUIV: -5.5

## 2022-07-27 ASSESSMENT — REFRACTION_AUTOREFRACTION
OS_CYLINDER: +0.50
OD_CYLINDER: +1.50
OD_SPHERE: -5.50
OD_AXIS: 005
OS_AXIS: 120
OS_SPHERE: -0.25

## 2022-07-27 ASSESSMENT — KERATOMETRY
OS_AXISANGLE_DEGREES: 170
METHOD_AUTO_MANUAL: AUTO
OD_K2POWER_DIOPTERS: 44.75
OS_K2POWER_DIOPTERS: 44.00
OD_K1POWER_DIOPTERS: 43.75
OD_AXISANGLE_DEGREES: 5
OS_K1POWER_DIOPTERS: 43.50

## 2022-07-27 ASSESSMENT — TONOMETRY
OD_IOP_MMHG: 15
OD_IOP_MMHG: 13

## 2022-07-27 ASSESSMENT — VISUAL ACUITY
OS_BCVA: 20/30
OD_BCVA: 20/30

## 2022-08-03 ENCOUNTER — DOCTOR'S OFFICE (OUTPATIENT)
Dept: URBAN - METROPOLITAN AREA CLINIC 157 | Facility: CLINIC | Age: 57
Setting detail: OPHTHALMOLOGY
End: 2022-08-03
Payer: COMMERCIAL

## 2022-08-03 ENCOUNTER — RX ONLY (RX ONLY)
Age: 57
End: 2022-08-03

## 2022-08-03 PROCEDURE — 99024 POSTOP FOLLOW-UP VISIT: CPT | Performed by: OPHTHALMOLOGY

## 2022-08-03 PROCEDURE — 92136 OPHTHALMIC BIOMETRY: CPT | Performed by: OPHTHALMOLOGY

## 2022-08-03 ASSESSMENT — AXIALLENGTH_DERIVED
OD_AL: 25.0189
OS_AL: 25.4115
OD_AL: 25.3519
OD_AL: 25.0189
OS_AL: 23.1939
OD_AL: 25.2399
OD_AL: 25.0189
OS_AL: 25.4115
OS_AL: 23.1939
OS_AL: 25.525
OS_AL: 25.2989

## 2022-08-03 ASSESSMENT — SPHEQUIV_DERIVED
OD_SPHEQUIV: -4.75
OS_SPHEQUIV: -6
OD_SPHEQUIV: -5.5
OS_SPHEQUIV: -5.75
OD_SPHEQUIV: -5.25
OS_SPHEQUIV: -5.5
OS_SPHEQUIV: -0.375
OD_SPHEQUIV: -4.75
OS_SPHEQUIV: -0.375
OD_SPHEQUIV: -4.75
OS_SPHEQUIV: -5.75

## 2022-08-03 ASSESSMENT — REFRACTION_MANIFEST
OS_SPHERE: -6.00
OD_ADD: +2.50
OS_CYLINDER: +1.00
OS_SPHERE: -6.50
OS_CYLINDER: +0.75
OD_VA1: 20/25
OD_AXIS: 015
OD_VA2: 20/20(J1+)
OD_SPHERE: -5.25
OD_VA1: 20/25+1
OD_CYLINDER: +1.50
OS_AXIS: 130
OS_VA1: 20/25
OD_VA1: 20/30-1
OS_SPHERE: -6.00
OS_VA2: BINOC
OS_VA1: 20/50
OD_CYLINDER: +1.00
OS_VA1: 20/80
OD_AXIS: 010
OS_CYLINDER: +0.50
OD_VA1: 20/30-2
OS_AXIS: 150
OS_AXIS: 095
OS_ADD: +2.25
OS_SPHERE: -6.00
OD_VA2: 20/25(J1)
OD_SPHERE: -6.00
OD_SPHERE: -6.00
OS_ADD: +2.50
OD_AXIS: 010
OS_VA1: 20/25
OS_CYLINDER: +0.50
OS_VA1: 20/20
OD_CYLINDER: +1.00
OD_AXIS: 005
OS_SPHERE: -0.75
OS_CYLINDER: +1.00
OS_AXIS: 120
OD_ADD: +2.50
OS_AXIS: 130
OS_VA2: 20/BINOC
OD_SPHERE: -5.25
OD_CYLINDER: +1.00

## 2022-08-03 ASSESSMENT — REFRACTION_CURRENTRX
OS_OVR_VA: 20/
OD_AXIS: 015
OS_ADD: +2.25
OD_ADD: +2.25
OD_VPRISM_DIRECTION: PROGS
OS_SPHERE: -6.00
OD_CYLINDER: +1.00
OD_SPHERE: -6.00
OS_CYLINDER: +0.50
OS_VPRISM_DIRECTION: PROGS
OD_OVR_VA: 20/
OS_AXIS: 130

## 2022-08-03 ASSESSMENT — VISUAL ACUITY
OS_BCVA: 20/500
OD_BCVA: 20/30+2

## 2022-08-03 ASSESSMENT — TONOMETRY
OS_IOP_MMHG: 17
OD_IOP_MMHG: 16

## 2022-08-03 ASSESSMENT — KERATOMETRY
METHOD_AUTO_MANUAL: AUTO
OD_AXISANGLE_DEGREES: 005
OS_K1POWER_DIOPTERS: 44.50
OD_K1POWER_DIOPTERS: 44.75
OS_AXISANGLE_DEGREES: 100
OD_K2POWER_DIOPTERS: 45.00
OS_K2POWER_DIOPTERS: 45.50

## 2022-08-03 ASSESSMENT — REFRACTION_AUTOREFRACTION
OD_SPHERE: -5.50
OD_CYLINDER: +1.50
OS_CYLINDER: +0.75
OD_AXIS: 005
OS_SPHERE: -0.75
OS_AXIS: 115

## 2022-08-18 ENCOUNTER — AMBUL SURGICAL CARE (OUTPATIENT)
Dept: URBAN - METROPOLITAN AREA CLINIC 158 | Facility: CLINIC | Age: 57
Setting detail: OPHTHALMOLOGY
End: 2022-08-18
Payer: COMMERCIAL

## 2022-08-18 PROCEDURE — 66984 XCAPSL CTRC RMVL W/O ECP: CPT | Performed by: OPHTHALMOLOGY

## 2022-08-19 ENCOUNTER — DOCTOR'S OFFICE (OUTPATIENT)
Dept: URBAN - METROPOLITAN AREA CLINIC 157 | Facility: CLINIC | Age: 57
Setting detail: OPHTHALMOLOGY
End: 2022-08-19
Payer: COMMERCIAL

## 2022-08-19 PROCEDURE — 99024 POSTOP FOLLOW-UP VISIT: CPT | Performed by: OPTOMETRIST

## 2022-08-19 ASSESSMENT — REFRACTION_CURRENTRX
OS_SPHERE: -6.00
OD_VPRISM_DIRECTION: PROGS
OS_ADD: +2.25
OD_OVR_VA: 20/
OD_ADD: +2.25
OS_VPRISM_DIRECTION: PROGS
OS_CYLINDER: +0.50
OS_AXIS: 130
OD_AXIS: 015
OD_SPHERE: -6.00
OD_CYLINDER: +1.00
OS_OVR_VA: 20/

## 2022-08-19 ASSESSMENT — REFRACTION_MANIFEST
OS_CYLINDER: +1.00
OS_AXIS: 130
OS_CYLINDER: +1.00
OD_SPHERE: -5.25
OS_AXIS: 120
OS_ADD: +2.50
OS_VA1: 20/20
OS_CYLINDER: +0.50
OD_AXIS: 010
OS_SPHERE: -6.00
OD_SPHERE: -6.00
OS_AXIS: 150
OS_CYLINDER: +0.75
OS_CYLINDER: +0.50
OD_ADD: +2.50
OD_VA1: 20/25
OS_SPHERE: -6.50
OD_CYLINDER: +1.50
OD_VA1: 20/30-1
OS_SPHERE: -6.00
OS_VA1: 20/80
OS_VA1: 20/25
OD_CYLINDER: +1.00
OS_VA1: 20/50
OS_AXIS: 095
OD_VA1: 20/30-2
OD_AXIS: 005
OD_VA2: 20/25(J1)
OD_AXIS: 010
OD_CYLINDER: +1.00
OD_VA1: 20/25+1
OS_VA1: 20/25
OD_CYLINDER: +1.00
OD_VA2: 20/20(J1+)
OD_SPHERE: -6.00
OS_VA2: 20/BINOC
OS_SPHERE: -0.75
OD_ADD: +2.50
OS_AXIS: 130
OS_VA2: BINOC
OS_ADD: +2.25
OD_AXIS: 015
OS_SPHERE: -6.00
OD_SPHERE: -5.25

## 2022-08-19 ASSESSMENT — KERATOMETRY
OS_K2POWER_DIOPTERS: 45.50
OS_K1POWER_DIOPTERS: 44.50
OD_K2POWER_DIOPTERS: 45.00
OD_AXISANGLE_DEGREES: 005
OS_AXISANGLE_DEGREES: 100
OD_K1POWER_DIOPTERS: 44.75
METHOD_AUTO_MANUAL: AUTO

## 2022-08-19 ASSESSMENT — REFRACTION_AUTOREFRACTION
OD_CYLINDER: +1.50
OD_AXIS: 005
OD_SPHERE: -5.50
OS_SPHERE: -0.75
OS_AXIS: 115
OS_CYLINDER: +0.75

## 2022-08-19 ASSESSMENT — SPHEQUIV_DERIVED
OD_SPHEQUIV: -4.75
OD_SPHEQUIV: -4.75
OS_SPHEQUIV: -5.5
OS_SPHEQUIV: -5.75
OS_SPHEQUIV: -0.375
OD_SPHEQUIV: -5.5
OS_SPHEQUIV: -6
OD_SPHEQUIV: -4.75
OD_SPHEQUIV: -5.25
OS_SPHEQUIV: -5.75
OS_SPHEQUIV: -0.375

## 2022-08-19 ASSESSMENT — AXIALLENGTH_DERIVED
OD_AL: 25.0189
OS_AL: 25.2989
OS_AL: 25.4115
OS_AL: 25.4115
OD_AL: 25.3519
OS_AL: 23.1939
OS_AL: 23.1939
OD_AL: 25.0189
OD_AL: 25.0189
OD_AL: 25.2399
OS_AL: 25.525

## 2022-08-19 ASSESSMENT — CONFRONTATIONAL VISUAL FIELD TEST (CVF)
OD_FINDINGS: FULL
OS_FINDINGS: FULL

## 2022-08-19 ASSESSMENT — TONOMETRY
OD_IOP_MMHG: 18
OS_IOP_MMHG: 15
OD_IOP_MMHG: 18

## 2022-08-19 ASSESSMENT — LID POSITION - ENTROPION: OS_ENTROPION: LUL 1+

## 2022-08-19 ASSESSMENT — LID EXAM ASSESSMENTS
OD_MEIBOMITIS: RLL 1+
OS_MEIBOMITIS: LLL LUL 1+

## 2022-08-19 ASSESSMENT — VISUAL ACUITY
OD_BCVA: 20/30+2
OS_BCVA: 20/30

## 2022-09-02 ENCOUNTER — DOCTOR'S OFFICE (OUTPATIENT)
Dept: URBAN - METROPOLITAN AREA CLINIC 157 | Facility: CLINIC | Age: 57
Setting detail: OPHTHALMOLOGY
End: 2022-09-02
Payer: COMMERCIAL

## 2022-09-02 PROBLEM — H25.042 PSC POLAR AGE RELATED CATARACT; LEFT EYE: Status: ACTIVE | Noted: 2022-06-17

## 2022-09-02 PROBLEM — H02.89 FLOPPY LID SYNDROME: Status: ACTIVE | Noted: 2020-02-10

## 2022-09-02 PROBLEM — H02.004 ENTROPION; LEFT UPPER LID: Status: ACTIVE | Noted: 2022-06-17

## 2022-09-02 PROBLEM — Z96.1 PRESENCE OF INTRAOCULAR LENS ; BOTH EYES: Status: ACTIVE | Noted: 2022-07-27

## 2022-09-02 PROBLEM — H25.11 CATARACT NUCLEAR SCLEROSIS AGE RELATED; RIGHT EYE: Status: ACTIVE | Noted: 2022-07-27

## 2022-09-02 PROCEDURE — 99024 POSTOP FOLLOW-UP VISIT: CPT | Performed by: OPTOMETRIST

## 2022-09-02 ASSESSMENT — REFRACTION_CURRENTRX
OD_CYLINDER: +1.00
OS_SPHERE: -6.00
OD_VPRISM_DIRECTION: SV
OS_AXIS: 130
OS_CYLINDER: +0.50
OS_SPHERE: +1.50
OD_SPHERE: +1.50
OS_VPRISM_DIRECTION: PROGS
OD_SPHERE: -6.00
OD_AXIS: 015
OD_VPRISM_DIRECTION: PROGS
OS_VPRISM_DIRECTION: SV
OS_ADD: +2.25
OS_OVR_VA: 20/
OD_OVR_VA: 20/
OD_OVR_VA: 20/
OD_ADD: +2.25
OS_OVR_VA: 20/

## 2022-09-02 ASSESSMENT — TONOMETRY
OS_IOP_MMHG: 13
OD_IOP_MMHG: 16

## 2022-09-02 ASSESSMENT — REFRACTION_AUTOREFRACTION
OD_AXIS: 010
OD_SPHERE: -1.25
OS_CYLINDER: +0.75
OD_CYLINDER: +0.50
OS_AXIS: 115
OS_SPHERE: -0.50

## 2022-09-02 ASSESSMENT — REFRACTION_MANIFEST
OD_VA1: 20/30-1
OD_CYLINDER: +0.25
OS_VA1: 20/20
OD_ADD: +2.50
OS_AXIS: 130
OS_VA1: 20/20
OS_VA1: 20/25
OD_SPHERE: -0.75
OS_VA2: 20/BINOC
OD_VA1: 20/30-2
OS_SPHERE: -1.00
OS_SPHERE: -0.75
OS_VA1: 20/50
OD_SPHERE: -5.25
OD_CYLINDER: +1.00
OD_AXIS: 015
OS_CYLINDER: +0.75
OS_AXIS: 105
OS_AXIS: 150
OS_CYLINDER: +1.00
OS_SPHERE: -6.00
OD_VA2: 20/20(J1+)
OS_SPHERE: -6.00
OD_AXIS: 170
OS_SPHERE: -6.50
OD_SPHERE: -6.00
OS_CYLINDER: +0.50
OD_CYLINDER: +1.00
OD_AXIS: 010
OD_CYLINDER: +1.00
OS_AXIS: 095
OS_CYLINDER: +1.25
OS_CYLINDER: +1.00
OD_VA1: 20/25
OD_AXIS: 010
OS_VA1: 20/80
OS_ADD: +2.50
OD_SPHERE: -5.25
OS_AXIS: 120
OD_VA1: 20/20

## 2022-09-02 ASSESSMENT — KERATOMETRY
OS_AXISANGLE_DEGREES: 110
OD_K2POWER_DIOPTERS: 45.00
METHOD_AUTO_MANUAL: AUTO
OS_K2POWER_DIOPTERS: 44.75
OS_K1POWER_DIOPTERS: 44.0
OD_AXISANGLE_DEGREES: 120
OD_K1POWER_DIOPTERS: 44.75

## 2022-09-02 ASSESSMENT — SPHEQUIV_DERIVED
OS_SPHEQUIV: -5.5
OD_SPHEQUIV: -5.5
OS_SPHEQUIV: -0.125
OD_SPHEQUIV: -4.75
OD_SPHEQUIV: -0.625
OS_SPHEQUIV: -0.375
OS_SPHEQUIV: -6
OS_SPHEQUIV: -0.375
OD_SPHEQUIV: -1
OD_SPHEQUIV: -4.75
OS_SPHEQUIV: -5.75

## 2022-09-02 ASSESSMENT — AXIALLENGTH_DERIVED
OS_AL: 25.6806
OS_AL: 25.7966
OS_AL: 23.4179
OD_AL: 25.0189
OS_AL: 23.4179
OD_AL: 23.477
OD_AL: 25.0189
OS_AL: 23.3223
OD_AL: 25.3519
OS_AL: 25.5657
OD_AL: 23.3332

## 2022-09-02 ASSESSMENT — LID EXAM ASSESSMENTS
OD_MEIBOMITIS: RLL 1+
OS_MEIBOMITIS: LLL LUL 1+

## 2022-09-02 ASSESSMENT — CONFRONTATIONAL VISUAL FIELD TEST (CVF)
OD_FINDINGS: FULL
OS_FINDINGS: FULL

## 2022-09-02 ASSESSMENT — LID POSITION - ENTROPION: OS_ENTROPION: LUL 1+

## 2022-09-02 ASSESSMENT — VISUAL ACUITY
OS_BCVA: 20/30-1
OD_BCVA: 20/30+3

## 2022-12-06 ENCOUNTER — DOCTOR'S OFFICE (OUTPATIENT)
Dept: URBAN - METROPOLITAN AREA CLINIC 157 | Facility: CLINIC | Age: 57
Setting detail: OPHTHALMOLOGY
End: 2022-12-06
Payer: COMMERCIAL

## 2022-12-06 DIAGNOSIS — Z96.1: ICD-10-CM

## 2022-12-06 DIAGNOSIS — H26.493: ICD-10-CM

## 2022-12-06 DIAGNOSIS — H16.223: ICD-10-CM

## 2022-12-06 DIAGNOSIS — H02.89: ICD-10-CM

## 2022-12-06 DIAGNOSIS — H10.13: ICD-10-CM

## 2022-12-06 DIAGNOSIS — H43.393: ICD-10-CM

## 2022-12-06 PROCEDURE — 92014 COMPRE OPH EXAM EST PT 1/>: CPT | Performed by: OPTOMETRIST

## 2022-12-06 ASSESSMENT — REFRACTION_MANIFEST
OD_CYLINDER: +1.00
OD_VA1: 20/25+1
OD_AXIS: 180
OS_VA1: 20/80
OS_SPHERE: -0.75
OD_AXIS: 170
OD_VA1: 20/20
OS_AXIS: 105
OD_CYLINDER: +1.00
OS_CYLINDER: +0.75
OD_VA1: 20/25
OS_VA1: 20/20
OS_CYLINDER: +0.75
OS_SPHERE: -0.75
OS_SPHERE: -6.00
OD_CYLINDER: +0.25
OD_CYLINDER: +0.75
OS_VA1: 20/50
OD_AXIS: 010
OD_SPHERE: -5.25
OD_SPHERE: -5.25
OS_CYLINDER: +1.25
OS_VA1: 20/20
OS_AXIS: 120
OS_SPHERE: -1.00
OS_VA1: 20/25+1
OD_AXIS: 010
OS_CYLINDER: +1.00
OD_SPHERE: -0.75
OS_AXIS: 095
OS_AXIS: 105
OD_SPHERE: -0.25
OD_VA1: 20/30-2
OS_CYLINDER: +1.00
OS_AXIS: 150
OS_SPHERE: -6.50

## 2022-12-06 ASSESSMENT — KERATOMETRY
OD_K2POWER_DIOPTERS: 45.00
OD_AXISANGLE_DEGREES: 120
OS_K1POWER_DIOPTERS: 44.0
OS_AXISANGLE_DEGREES: 110
OD_K1POWER_DIOPTERS: 44.75
METHOD_AUTO_MANUAL: AUTO
OS_K2POWER_DIOPTERS: 44.75

## 2022-12-06 ASSESSMENT — LID EXAM ASSESSMENTS
OD_MEIBOMITIS: RLL 1+
OS_MEIBOMITIS: LLL LUL 1+

## 2022-12-06 ASSESSMENT — REFRACTION_CURRENTRX
OD_ADD: +2.25
OD_CYLINDER: +1.00
OD_AXIS: 015
OS_CYLINDER: +0.50
OS_VPRISM_DIRECTION: PROGS
OD_OVR_VA: 20/
OS_VPRISM_DIRECTION: SV
OD_VPRISM_DIRECTION: SV
OS_ADD: +2.25
OS_AXIS: 130
OD_OVR_VA: 20/
OS_SPHERE: -6.00
OD_VPRISM_DIRECTION: PROGS
OD_SPHERE: +1.50
OD_SPHERE: -6.00
OS_OVR_VA: 20/
OS_OVR_VA: 20/
OS_SPHERE: +1.50

## 2022-12-06 ASSESSMENT — REFRACTION_AUTOREFRACTION
OS_AXIS: 115
OD_AXIS: 010
OS_SPHERE: -0.50
OD_SPHERE: -1.25
OS_CYLINDER: +0.75
OD_CYLINDER: +0.50

## 2022-12-06 ASSESSMENT — AXIALLENGTH_DERIVED
OS_AL: 23.4179
OS_AL: 25.7966
OS_AL: 23.4179
OS_AL: 23.3223
OD_AL: 23.477
OD_AL: 23.0509
OD_AL: 25.0189
OS_AL: 23.4179
OS_AL: 25.5657
OD_AL: 25.0189
OD_AL: 23.3332

## 2022-12-06 ASSESSMENT — VISUAL ACUITY
OD_BCVA: 20/40
OS_BCVA: 20/30-1

## 2022-12-06 ASSESSMENT — TONOMETRY
OS_IOP_MMHG: 15
OD_IOP_MMHG: 12

## 2022-12-06 ASSESSMENT — SPHEQUIV_DERIVED
OS_SPHEQUIV: -5.5
OD_SPHEQUIV: -4.75
OD_SPHEQUIV: -0.625
OS_SPHEQUIV: -0.375
OD_SPHEQUIV: -4.75
OD_SPHEQUIV: 0.125
OS_SPHEQUIV: -0.375
OS_SPHEQUIV: -0.125
OD_SPHEQUIV: -1
OS_SPHEQUIV: -0.375
OS_SPHEQUIV: -6

## 2022-12-06 ASSESSMENT — CONFRONTATIONAL VISUAL FIELD TEST (CVF)
OD_FINDINGS: FULL
OS_FINDINGS: FULL

## 2022-12-06 ASSESSMENT — TEAR BREAK UP TIME (TBUT)
OD_TBUT: 1+
OS_TBUT: 1+

## 2022-12-10 ENCOUNTER — DOCTOR'S OFFICE (OUTPATIENT)
Dept: URBAN - METROPOLITAN AREA CLINIC 157 | Facility: CLINIC | Age: 57
Setting detail: OPHTHALMOLOGY
End: 2022-12-10
Payer: COMMERCIAL

## 2022-12-10 DIAGNOSIS — Z96.1: ICD-10-CM

## 2022-12-10 DIAGNOSIS — H04.123: ICD-10-CM

## 2022-12-10 DIAGNOSIS — H26.493: ICD-10-CM

## 2022-12-10 PROCEDURE — 92012 INTRM OPH EXAM EST PATIENT: CPT | Performed by: OPHTHALMOLOGY

## 2022-12-10 ASSESSMENT — TEAR BREAK UP TIME (TBUT)
OD_TBUT: 1+
OS_TBUT: 1+

## 2022-12-10 ASSESSMENT — REFRACTION_MANIFEST
OD_CYLINDER: +1.00
OD_SPHERE: -0.75
OD_AXIS: 010
OS_SPHERE: -1.00
OS_CYLINDER: +1.00
OS_AXIS: 120
OS_SPHERE: -6.50
OS_CYLINDER: +0.75
OS_SPHERE: -0.75
OS_AXIS: 105
OD_AXIS: 175
OD_SPHERE: -0.25
OS_SPHERE: -6.00
OD_VA1: 20/30-2
OS_AXIS: 095
OS_CYLINDER: +0.75
OD_VA1: 20/25
OD_SPHERE: -5.25
OS_VA1: 20/80
OD_CYLINDER: +0.25
OS_AXIS: 150
OD_VA1: 20/20
OD_CYLINDER: +1.00
OS_AXIS: 105
OS_CYLINDER: +1.00
OS_VA1: 20/20
OD_SPHERE: -0.50
OS_AXIS: 115
OS_VA1: 20/25-1
OD_AXIS: 010
OS_SPHERE: -1.00
OD_CYLINDER: +1.00
OD_AXIS: 180
OS_CYLINDER: +1.00
OS_CYLINDER: +1.25
OS_VA1: 20/50
OS_VA1: 20/20
OS_SPHERE: -0.75
OS_VA1: 20/25+1
OD_SPHERE: -5.25
OD_AXIS: 170
OD_CYLINDER: +0.75
OD_VA1: 20/40
OD_VA1: 20/25+1

## 2022-12-10 ASSESSMENT — SPHEQUIV_DERIVED
OS_SPHEQUIV: -6
OS_SPHEQUIV: -0.375
OS_SPHEQUIV: -5.5
OD_SPHEQUIV: -4.75
OD_SPHEQUIV: 0
OS_SPHEQUIV: -0.625
OS_SPHEQUIV: -0.375
OD_SPHEQUIV: -4.75
OS_SPHEQUIV: -0.375
OD_SPHEQUIV: -0.625
OS_SPHEQUIV: -0.5
OD_SPHEQUIV: 0
OD_SPHEQUIV: 0.125

## 2022-12-10 ASSESSMENT — AXIALLENGTH_DERIVED
OD_AL: 23.3196
OD_AL: 23.3196
OD_AL: 23.2721
OS_AL: 23.283
OS_AL: 23.283
OS_AL: 23.3305
OS_AL: 25.6329
OD_AL: 23.56
OD_AL: 25.2797
OS_AL: 23.283
OD_AL: 25.2797
OS_AL: 23.3782
OS_AL: 25.405

## 2022-12-10 ASSESSMENT — VISUAL ACUITY
OS_BCVA: 20/30
OD_BCVA: 20/50

## 2022-12-10 ASSESSMENT — KERATOMETRY
OD_K1POWER_DIOPTERS: 44.00
OS_AXISANGLE_DEGREES: 100
OS_K1POWER_DIOPTERS: 43.75
METHOD_AUTO_MANUAL: AUTO
OD_K2POWER_DIOPTERS: 44.50
OD_AXISANGLE_DEGREES: 155
OS_K2POWER_DIOPTERS: 45.75

## 2022-12-10 ASSESSMENT — REFRACTION_CURRENTRX
OD_SPHERE: -6.00
OS_ADD: +2.25
OS_VPRISM_DIRECTION: PROGS
OD_SPHERE: +1.50
OD_CYLINDER: +1.00
OS_CYLINDER: +0.50
OS_SPHERE: -6.00
OS_SPHERE: +1.50
OD_ADD: +2.25
OS_OVR_VA: 20/
OD_OVR_VA: 20/
OS_OVR_VA: 20/
OS_VPRISM_DIRECTION: SV
OD_AXIS: 015
OD_VPRISM_DIRECTION: PROGS
OD_VPRISM_DIRECTION: SV
OD_OVR_VA: 20/
OS_AXIS: 130

## 2022-12-10 ASSESSMENT — REFRACTION_AUTOREFRACTION
OS_CYLINDER: +1.25
OS_AXIS: 115
OD_AXIS: 180
OD_CYLINDER: +1.00
OS_SPHERE: -1.25
OD_SPHERE: -0.50

## 2022-12-10 ASSESSMENT — TONOMETRY
OS_IOP_MMHG: 14
OD_IOP_MMHG: 12

## 2022-12-22 ENCOUNTER — DOCTOR'S OFFICE (OUTPATIENT)
Dept: URBAN - METROPOLITAN AREA CLINIC 157 | Facility: CLINIC | Age: 57
Setting detail: OPHTHALMOLOGY
End: 2022-12-22
Payer: COMMERCIAL

## 2022-12-22 DIAGNOSIS — H26.491: ICD-10-CM

## 2022-12-22 PROBLEM — H04.123 DRY EYE SYNDROME LACRIMAL GLAND; BOTH EYES: Status: ACTIVE | Noted: 2022-12-10

## 2022-12-22 PROBLEM — H43.393 VITREOUS OPACITIES, OTHER; BOTH EYES: Status: ACTIVE | Noted: 2022-12-06

## 2022-12-22 PROBLEM — H10.13 ALLERGIC CONJUNCTIVITS; BOTH EYES: Status: ACTIVE | Noted: 2022-12-06

## 2022-12-22 PROCEDURE — 66821 AFTER CATARACT LASER SURGERY: CPT | Performed by: OPHTHALMOLOGY

## 2022-12-22 ASSESSMENT — AXIALLENGTH_DERIVED
OS_AL: 23.283
OD_AL: 23.2721
OD_AL: 23.3196
OS_AL: 23.3305
OS_AL: 25.6329
OD_AL: 23.56
OS_AL: 25.405
OS_AL: 23.283
OS_AL: 23.3782
OD_AL: 25.2797
OD_AL: 25.2797
OS_AL: 23.283
OD_AL: 23.3196

## 2022-12-22 ASSESSMENT — REFRACTION_CURRENTRX
OS_ADD: +2.25
OS_VPRISM_DIRECTION: PROGS
OD_VPRISM_DIRECTION: SV
OD_AXIS: 015
OS_OVR_VA: 20/
OD_OVR_VA: 20/
OD_ADD: +2.25
OS_CYLINDER: +0.50
OD_CYLINDER: +1.00
OS_OVR_VA: 20/
OS_VPRISM_DIRECTION: SV
OS_AXIS: 130
OD_VPRISM_DIRECTION: PROGS
OS_SPHERE: -6.00
OD_SPHERE: -6.00
OD_SPHERE: +1.50
OS_SPHERE: +1.50
OD_OVR_VA: 20/

## 2022-12-22 ASSESSMENT — REFRACTION_MANIFEST
OS_VA1: 20/25+1
OD_AXIS: 010
OD_VA1: 20/30-2
OS_CYLINDER: +1.25
OS_VA1: 20/25-1
OD_VA1: 20/20
OS_CYLINDER: +1.00
OS_SPHERE: -1.00
OS_VA1: 20/20
OD_SPHERE: -5.25
OD_SPHERE: -0.50
OS_CYLINDER: +0.75
OS_VA1: 20/20
OD_AXIS: 170
OD_SPHERE: -5.25
OD_AXIS: 180
OD_SPHERE: -0.25
OD_VA1: 20/25+1
OS_SPHERE: -6.50
OD_CYLINDER: +1.00
OS_AXIS: 120
OS_AXIS: 115
OS_CYLINDER: +0.75
OD_VA1: 20/25
OS_AXIS: 105
OD_CYLINDER: +1.00
OD_AXIS: 010
OS_AXIS: 095
OS_AXIS: 150
OS_SPHERE: -1.00
OS_CYLINDER: +1.00
OD_SPHERE: -0.75
OD_AXIS: 175
OD_CYLINDER: +0.75
OD_VA1: 20/40
OS_VA1: 20/80
OD_CYLINDER: +0.25
OD_CYLINDER: +1.00
OS_VA1: 20/50
OS_AXIS: 105
OS_SPHERE: -6.00
OS_SPHERE: -0.75
OS_CYLINDER: +1.00
OS_SPHERE: -0.75

## 2022-12-22 ASSESSMENT — SPHEQUIV_DERIVED
OS_SPHEQUIV: -6
OS_SPHEQUIV: -0.375
OS_SPHEQUIV: -0.375
OD_SPHEQUIV: 0
OD_SPHEQUIV: 0
OS_SPHEQUIV: -0.5
OD_SPHEQUIV: 0.125
OD_SPHEQUIV: -0.625
OS_SPHEQUIV: -0.375
OD_SPHEQUIV: -4.75
OD_SPHEQUIV: -4.75
OS_SPHEQUIV: -0.625
OS_SPHEQUIV: -5.5

## 2022-12-22 ASSESSMENT — REFRACTION_AUTOREFRACTION
OD_CYLINDER: +1.00
OD_AXIS: 180
OS_CYLINDER: +1.25
OD_SPHERE: -0.50
OS_SPHERE: -1.25
OS_AXIS: 115

## 2022-12-22 ASSESSMENT — VISUAL ACUITY
OD_BCVA: 20/50
OS_BCVA: 20/30

## 2022-12-22 ASSESSMENT — KERATOMETRY
OD_AXISANGLE_DEGREES: 155
OD_K2POWER_DIOPTERS: 44.50
OD_K1POWER_DIOPTERS: 44.00
OS_AXISANGLE_DEGREES: 100
METHOD_AUTO_MANUAL: AUTO
OS_K1POWER_DIOPTERS: 43.75
OS_K2POWER_DIOPTERS: 45.75

## 2022-12-22 ASSESSMENT — TEAR BREAK UP TIME (TBUT)
OS_TBUT: 1+
OD_TBUT: 1+

## 2023-01-12 ENCOUNTER — DOCTOR'S OFFICE (OUTPATIENT)
Dept: URBAN - METROPOLITAN AREA CLINIC 157 | Facility: CLINIC | Age: 58
Setting detail: OPHTHALMOLOGY
End: 2023-01-12
Payer: COMMERCIAL

## 2023-01-12 DIAGNOSIS — H26.492: ICD-10-CM

## 2023-01-12 PROCEDURE — 66821 AFTER CATARACT LASER SURGERY: CPT | Performed by: OPHTHALMOLOGY

## 2023-01-27 PROBLEM — H26.492 PCO OBSCURING VISION; LEFT EYE: Status: ACTIVE | Noted: 2023-01-12

## 2023-01-27 ASSESSMENT — KERATOMETRY
METHOD_AUTO_MANUAL: AUTO
OS_AXISANGLE_DEGREES: 100
OD_K2POWER_DIOPTERS: 44.50
OD_K1POWER_DIOPTERS: 44.00
OS_K2POWER_DIOPTERS: 45.75
OS_K1POWER_DIOPTERS: 43.75
OD_AXISANGLE_DEGREES: 155

## 2023-01-27 ASSESSMENT — SPHEQUIV_DERIVED
OD_SPHEQUIV: -0.625
OD_SPHEQUIV: 0
OS_SPHEQUIV: -0.375
OD_SPHEQUIV: 0.125
OS_SPHEQUIV: -0.5
OD_SPHEQUIV: -4.75
OD_SPHEQUIV: -4.75
OS_SPHEQUIV: -0.375
OS_SPHEQUIV: -5.5
OS_SPHEQUIV: -6
OD_SPHEQUIV: 0
OS_SPHEQUIV: -0.375
OS_SPHEQUIV: -0.625

## 2023-01-27 ASSESSMENT — REFRACTION_MANIFEST
OS_SPHERE: -0.75
OS_SPHERE: -1.00
OS_CYLINDER: +1.00
OS_AXIS: 150
OS_CYLINDER: +1.00
OS_CYLINDER: +0.75
OS_VA1: 20/20
OD_VA1: 20/30-2
OS_VA1: 20/50
OS_CYLINDER: +1.00
OD_CYLINDER: +0.75
OD_VA1: 20/25
OD_CYLINDER: +1.00
OD_CYLINDER: +1.00
OS_VA1: 20/20
OS_VA1: 20/25+1
OS_SPHERE: -0.75
OS_AXIS: 105
OD_SPHERE: -5.25
OS_SPHERE: -6.00
OD_AXIS: 175
OD_CYLINDER: +1.00
OD_SPHERE: -0.25
OD_CYLINDER: +0.25
OD_AXIS: 170
OD_VA1: 20/20
OS_VA1: 20/80
OD_VA1: 20/40
OD_SPHERE: -0.50
OS_CYLINDER: +1.25
OS_CYLINDER: +0.75
OS_VA1: 20/25-1
OD_VA1: 20/25+1
OD_AXIS: 180
OS_SPHERE: -1.00
OS_AXIS: 105
OS_SPHERE: -6.50
OD_AXIS: 010
OS_AXIS: 095
OS_AXIS: 115
OD_SPHERE: -5.25
OD_AXIS: 010
OS_AXIS: 120
OD_SPHERE: -0.75

## 2023-01-27 ASSESSMENT — REFRACTION_CURRENTRX
OS_VPRISM_DIRECTION: PROGS
OS_SPHERE: -6.00
OS_CYLINDER: +0.50
OS_AXIS: 130
OS_OVR_VA: 20/
OS_ADD: +2.25
OD_OVR_VA: 20/
OD_CYLINDER: +1.00
OS_SPHERE: +1.50
OD_SPHERE: +1.50
OD_VPRISM_DIRECTION: SV
OS_OVR_VA: 20/
OD_AXIS: 015
OD_OVR_VA: 20/
OD_ADD: +2.25
OD_SPHERE: -6.00
OS_VPRISM_DIRECTION: SV
OD_VPRISM_DIRECTION: PROGS

## 2023-01-27 ASSESSMENT — AXIALLENGTH_DERIVED
OD_AL: 23.3196
OD_AL: 23.2721
OD_AL: 25.2797
OD_AL: 23.3196
OS_AL: 23.283
OD_AL: 23.56
OS_AL: 23.283
OS_AL: 23.3782
OS_AL: 25.405
OS_AL: 23.283
OD_AL: 25.2797
OS_AL: 23.3305
OS_AL: 25.6329

## 2023-01-27 ASSESSMENT — TEAR BREAK UP TIME (TBUT)
OD_TBUT: 1+
OS_TBUT: 1+

## 2023-01-27 ASSESSMENT — REFRACTION_AUTOREFRACTION
OD_AXIS: 180
OS_AXIS: 115
OS_SPHERE: -1.25
OD_SPHERE: -0.50
OD_CYLINDER: +1.00
OS_CYLINDER: +1.25

## 2023-01-27 ASSESSMENT — VISUAL ACUITY
OD_BCVA: 20/50
OS_BCVA: 20/30

## 2023-06-09 ENCOUNTER — DOCTOR'S OFFICE (OUTPATIENT)
Dept: URBAN - METROPOLITAN AREA CLINIC 157 | Facility: CLINIC | Age: 58
Setting detail: OPHTHALMOLOGY
End: 2023-06-09
Payer: COMMERCIAL

## 2023-06-09 DIAGNOSIS — H02.89: ICD-10-CM

## 2023-06-09 PROCEDURE — 99213 OFFICE O/P EST LOW 20 MIN: CPT | Performed by: OPTOMETRIST

## 2023-06-09 ASSESSMENT — KERATOMETRY
OS_K1POWER_DIOPTERS: 43.75
OS_AXISANGLE_DEGREES: 100
OD_AXISANGLE_DEGREES: 155
OS_K2POWER_DIOPTERS: 45.75
OD_K1POWER_DIOPTERS: 44.00
METHOD_AUTO_MANUAL: AUTO
OD_K2POWER_DIOPTERS: 44.50

## 2023-06-09 ASSESSMENT — REFRACTION_MANIFEST
OS_AXIS: 105
OS_CYLINDER: +1.00
OD_VA1: 20/30-2
OS_SPHERE: -1.00
OS_SPHERE: -1.00
OS_AXIS: 115
OD_SPHERE: -5.25
OD_CYLINDER: +0.25
OD_SPHERE: -0.75
OD_AXIS: 010
OS_VA1: 20/80
OD_VA1: 20/25+1
OS_CYLINDER: +1.00
OS_CYLINDER: +0.75
OD_SPHERE: -5.25
OD_CYLINDER: +1.00
OS_SPHERE: -0.75
OS_SPHERE: -6.00
OS_VA1: 20/20
OD_CYLINDER: +1.00
OS_CYLINDER: +0.75
OD_VA1: 20/25
OD_SPHERE: -0.25
OD_VA1: 20/40
OS_SPHERE: -6.50
OS_AXIS: 120
OD_VA1: 20/20
OD_AXIS: 175
OS_AXIS: 095
OD_CYLINDER: +1.00
OD_SPHERE: -0.50
OD_CYLINDER: +0.75
OS_CYLINDER: +1.00
OS_CYLINDER: +1.25
OS_VA1: 20/50
OS_VA1: 20/25+1
OS_SPHERE: -0.75
OD_AXIS: 180
OD_AXIS: 170
OS_VA1: 20/25-1
OD_AXIS: 010
OS_AXIS: 150
OS_VA1: 20/20
OS_AXIS: 105

## 2023-06-09 ASSESSMENT — VISUAL ACUITY
OS_BCVA: 20/40
OD_BCVA: 20/60

## 2023-06-09 ASSESSMENT — REFRACTION_AUTOREFRACTION
OS_SPHERE: -1.25
OD_AXIS: 180
OD_SPHERE: -0.50
OS_AXIS: 115
OS_CYLINDER: +1.25
OD_CYLINDER: +1.00

## 2023-06-09 ASSESSMENT — AXIALLENGTH_DERIVED
OS_AL: 23.283
OS_AL: 23.283
OD_AL: 23.3196
OS_AL: 23.283
OS_AL: 23.3782
OD_AL: 25.2797
OS_AL: 25.6329
OD_AL: 23.56
OD_AL: 23.2721
OS_AL: 25.405
OD_AL: 23.3196
OD_AL: 25.2797
OS_AL: 23.3305

## 2023-06-09 ASSESSMENT — SPHEQUIV_DERIVED
OS_SPHEQUIV: -0.375
OS_SPHEQUIV: -0.375
OD_SPHEQUIV: 0
OD_SPHEQUIV: -0.625
OS_SPHEQUIV: -0.375
OS_SPHEQUIV: -5.5
OD_SPHEQUIV: 0
OS_SPHEQUIV: -6
OD_SPHEQUIV: -4.75
OD_SPHEQUIV: -4.75
OS_SPHEQUIV: -0.5
OD_SPHEQUIV: 0.125
OS_SPHEQUIV: -0.625

## 2023-06-09 ASSESSMENT — REFRACTION_CURRENTRX
OS_AXIS: 130
OD_SPHERE: +1.50
OS_VPRISM_DIRECTION: SV
OS_OVR_VA: 20/
OD_VPRISM_DIRECTION: PROGS
OD_VPRISM_DIRECTION: SV
OD_ADD: +2.25
OD_AXIS: 015
OS_VPRISM_DIRECTION: PROGS
OD_OVR_VA: 20/
OS_ADD: +2.25
OS_CYLINDER: +0.50
OS_SPHERE: +1.50
OD_OVR_VA: 20/
OS_SPHERE: -6.00
OD_CYLINDER: +1.00
OD_SPHERE: -6.00
OS_OVR_VA: 20/

## 2023-06-09 ASSESSMENT — TEAR BREAK UP TIME (TBUT)
OS_TBUT: 1+
OD_TBUT: 1+

## 2023-06-09 ASSESSMENT — CONFRONTATIONAL VISUAL FIELD TEST (CVF)
OD_FINDINGS: FULL
OS_FINDINGS: FULL

## 2023-06-09 ASSESSMENT — LID EXAM ASSESSMENTS
OD_MEIBOMITIS: RLL 1+
OS_MEIBOMITIS: LLL LUL 1+
OS_EDEMA: LUL 2+

## 2023-06-16 ENCOUNTER — DOCTOR'S OFFICE (OUTPATIENT)
Dept: URBAN - METROPOLITAN AREA CLINIC 157 | Facility: CLINIC | Age: 58
Setting detail: OPHTHALMOLOGY
End: 2023-06-16
Payer: COMMERCIAL

## 2023-06-16 DIAGNOSIS — H02.89: ICD-10-CM

## 2023-06-16 DIAGNOSIS — H10.12: ICD-10-CM

## 2023-06-16 DIAGNOSIS — H26.493: ICD-10-CM

## 2023-06-16 PROBLEM — H16.223 KERATOCONJUNCTIVITIS SICCA ; BOTH EYES: Status: ACTIVE | Noted: 2023-06-09

## 2023-06-16 PROCEDURE — 99213 OFFICE O/P EST LOW 20 MIN: CPT | Performed by: OPTOMETRIST

## 2023-06-16 ASSESSMENT — TONOMETRY: OS_IOP_MMHG: 17

## 2023-06-16 ASSESSMENT — CONFRONTATIONAL VISUAL FIELD TEST (CVF)
OD_FINDINGS: FULL
OS_FINDINGS: FULL

## 2023-06-17 PROBLEM — H26.493 PCO; BOTH EYES: Status: ACTIVE | Noted: 2023-06-16

## 2023-06-17 ASSESSMENT — LID EXAM ASSESSMENTS
OS_MEIBOMITIS: LLL LUL 1+
OD_MEIBOMITIS: RLL 1+

## 2023-06-17 ASSESSMENT — REFRACTION_MANIFEST
OS_CYLINDER: +1.00
OS_AXIS: 150
OS_SPHERE: -0.75
OD_AXIS: 010
OD_CYLINDER: +1.00
OS_CYLINDER: +0.75
OD_SPHERE: -0.50
OD_CYLINDER: +0.75
OD_SPHERE: -0.25
OD_AXIS: 175
OD_VA1: 20/30-2
OS_SPHERE: -1.00
OD_SPHERE: -5.25
OS_SPHERE: -0.75
OS_CYLINDER: +1.00
OS_AXIS: 105
OS_AXIS: 095
OD_VA1: 20/20
OD_CYLINDER: +1.00
OS_CYLINDER: +1.25
OD_AXIS: 170
OS_SPHERE: -6.50
OD_VA1: 20/25
OS_SPHERE: -1.00
OD_CYLINDER: +1.00
OS_AXIS: 120
OS_VA1: 20/50
OD_CYLINDER: +0.25
OS_VA1: 20/20
OS_CYLINDER: +1.00
OS_CYLINDER: +0.75
OS_VA1: 20/25+1
OD_VA1: 20/40
OS_AXIS: 105
OS_AXIS: 115
OS_VA1: 20/25-1
OD_AXIS: 010
OD_SPHERE: -5.25
OS_SPHERE: -6.00
OD_VA1: 20/25+1
OD_AXIS: 180
OS_VA1: 20/80
OD_SPHERE: -0.75
OS_VA1: 20/20

## 2023-06-17 ASSESSMENT — KERATOMETRY
METHOD_AUTO_MANUAL: AUTO
OD_K1POWER_DIOPTERS: 44.00
OS_K1POWER_DIOPTERS: 43.75
OS_AXISANGLE_DEGREES: 100
OD_AXISANGLE_DEGREES: 155
OS_K2POWER_DIOPTERS: 45.75
OD_K2POWER_DIOPTERS: 44.50

## 2023-06-17 ASSESSMENT — REFRACTION_AUTOREFRACTION
OS_AXIS: 115
OS_CYLINDER: +1.25
OD_CYLINDER: +1.00
OD_AXIS: 180
OS_SPHERE: -1.25
OD_SPHERE: -0.50

## 2023-06-17 ASSESSMENT — REFRACTION_CURRENTRX
OD_CYLINDER: +1.00
OS_VPRISM_DIRECTION: PROGS
OD_SPHERE: -6.00
OS_SPHERE: -6.00
OD_ADD: +2.25
OS_OVR_VA: 20/
OD_VPRISM_DIRECTION: PROGS
OS_AXIS: 130
OD_OVR_VA: 20/
OS_CYLINDER: +0.50
OD_SPHERE: +1.50
OS_ADD: +2.25
OS_OVR_VA: 20/
OD_OVR_VA: 20/
OD_AXIS: 015
OS_SPHERE: +1.50
OD_VPRISM_DIRECTION: SV
OS_VPRISM_DIRECTION: SV

## 2023-06-17 ASSESSMENT — SPHEQUIV_DERIVED
OS_SPHEQUIV: -0.5
OD_SPHEQUIV: -4.75
OD_SPHEQUIV: 0
OS_SPHEQUIV: -0.375
OD_SPHEQUIV: 0
OS_SPHEQUIV: -0.375
OD_SPHEQUIV: -4.75
OD_SPHEQUIV: 0.125
OS_SPHEQUIV: -0.625
OS_SPHEQUIV: -5.5
OS_SPHEQUIV: -6
OD_SPHEQUIV: -0.625
OS_SPHEQUIV: -0.375

## 2023-06-17 ASSESSMENT — AXIALLENGTH_DERIVED
OS_AL: 23.283
OD_AL: 23.2721
OS_AL: 23.283
OD_AL: 25.2797
OD_AL: 23.3196
OS_AL: 25.405
OS_AL: 25.6329
OS_AL: 23.283
OS_AL: 23.3782
OD_AL: 23.56
OS_AL: 23.3305
OD_AL: 23.3196
OD_AL: 25.2797

## 2023-06-17 ASSESSMENT — VISUAL ACUITY
OD_BCVA: 20/60
OS_BCVA: 20/40

## 2023-06-17 ASSESSMENT — TEAR BREAK UP TIME (TBUT)
OD_TBUT: 1+
OS_TBUT: 1+

## 2024-05-03 ENCOUNTER — NEW PATIENT (OUTPATIENT)
Dept: URBAN - METROPOLITAN AREA CLINIC 103 | Facility: CLINIC | Age: 59
End: 2024-05-03

## 2024-05-03 VITALS — SYSTOLIC BLOOD PRESSURE: 111 MMHG | DIASTOLIC BLOOD PRESSURE: 77 MMHG

## 2024-05-03 DIAGNOSIS — Z96.1: ICD-10-CM

## 2024-05-03 DIAGNOSIS — H01.023: ICD-10-CM

## 2024-05-03 DIAGNOSIS — H43.393: ICD-10-CM

## 2024-05-03 DIAGNOSIS — H01.026: ICD-10-CM

## 2024-05-03 PROCEDURE — 99204 OFFICE O/P NEW MOD 45 MIN: CPT

## 2024-05-03 PROCEDURE — 92201 OPSCPY EXTND RTA DRAW UNI/BI: CPT

## 2024-05-03 PROCEDURE — 92134 CPTRZ OPH DX IMG PST SGM RTA: CPT

## 2024-05-03 ASSESSMENT — VISUAL ACUITY
OS_CC: 20/30-1
OS_SC: 20/20
OD_PH: 20/20
OD_CC: 20/40
OD_SC: 20/50

## 2024-05-03 ASSESSMENT — TONOMETRY
OS_IOP_MMHG: 14
OD_IOP_MMHG: 15